# Patient Record
Sex: MALE | Race: WHITE | NOT HISPANIC OR LATINO | Employment: FULL TIME | URBAN - METROPOLITAN AREA
[De-identification: names, ages, dates, MRNs, and addresses within clinical notes are randomized per-mention and may not be internally consistent; named-entity substitution may affect disease eponyms.]

---

## 2017-09-03 ENCOUNTER — HOSPITAL ENCOUNTER (INPATIENT)
Facility: HOSPITAL | Age: 37
LOS: 2 days | Discharge: HOME | End: 2017-09-06
Attending: EMERGENCY MEDICINE | Admitting: HOSPITALIST

## 2017-09-03 DIAGNOSIS — M00.9 PYOGENIC ARTHRITIS OF LEFT ELBOW, DUE TO UNSPECIFIED ORGANISM (HCC): Primary | ICD-10-CM

## 2017-09-03 LAB
ALBUMIN SERPL-MCNC: 4.1 G/DL (ref 3.5–5.2)
ALBUMIN/GLOB SERPL: 1.3 G/DL
ALP SERPL-CCNC: 92 U/L (ref 39–117)
ALT SERPL W P-5'-P-CCNC: 26 U/L (ref 1–41)
ANION GAP SERPL CALCULATED.3IONS-SCNC: 15 MMOL/L
AST SERPL-CCNC: 28 U/L (ref 1–40)
BILIRUB SERPL-MCNC: 0.7 MG/DL (ref 0.1–1.2)
BUN BLD-MCNC: 29 MG/DL (ref 6–20)
BUN/CREAT SERPL: 13.9 (ref 7–25)
CALCIUM SPEC-SCNC: 9.2 MG/DL (ref 8.6–10.5)
CHLORIDE SERPL-SCNC: 97 MMOL/L (ref 98–107)
CO2 SERPL-SCNC: 25 MMOL/L (ref 22–29)
CREAT BLD-MCNC: 2.09 MG/DL (ref 0.76–1.27)
D-LACTATE SERPL-SCNC: 2.2 MMOL/L (ref 0.5–2)
DACRYOCYTES BLD QL SMEAR: ABNORMAL
DEPRECATED RDW RBC AUTO: 43.5 FL (ref 37–54)
ERYTHROCYTE [DISTWIDTH] IN BLOOD BY AUTOMATED COUNT: 13.2 % (ref 11.5–14.5)
GFR SERPL CREATININE-BSD FRML MDRD: 36 ML/MIN/1.73
GLOBULIN UR ELPH-MCNC: 3.2 GM/DL
GLUCOSE BLD-MCNC: 93 MG/DL (ref 65–99)
HCT VFR BLD AUTO: 40.3 % (ref 40.4–52.2)
HGB BLD-MCNC: 13.7 G/DL (ref 13.7–17.6)
LYMPHOCYTES # BLD MANUAL: 0.31 10*3/MM3 (ref 0.9–4.8)
LYMPHOCYTES NFR BLD MANUAL: 2 % (ref 19.6–45.3)
LYMPHOCYTES NFR BLD MANUAL: 6 % (ref 5–12)
MCH RBC QN AUTO: 30.6 PG (ref 27–32.7)
MCHC RBC AUTO-ENTMCNC: 34 G/DL (ref 32.6–36.4)
MCV RBC AUTO: 90 FL (ref 79.8–96.2)
METAMYELOCYTES NFR BLD MANUAL: 2 % (ref 0–0)
MONOCYTES # BLD AUTO: 0.93 10*3/MM3 (ref 0.2–1.2)
NEUTROPHILS # BLD AUTO: 13.97 10*3/MM3 (ref 1.9–8.1)
NEUTROPHILS NFR BLD MANUAL: 90 % (ref 42.7–76)
NEUTS VAC BLD QL SMEAR: ABNORMAL
PLAT MORPH BLD: NORMAL
PLATELET # BLD AUTO: 158 10*3/MM3 (ref 140–500)
PMV BLD AUTO: 11.1 FL (ref 6–12)
POTASSIUM BLD-SCNC: 3.6 MMOL/L (ref 3.5–5.2)
PROT SERPL-MCNC: 7.3 G/DL (ref 6–8.5)
RBC # BLD AUTO: 4.48 10*6/MM3 (ref 4.6–6)
SCAN SLIDE: NORMAL
SODIUM BLD-SCNC: 137 MMOL/L (ref 136–145)
WBC NRBC COR # BLD: 15.52 10*3/MM3 (ref 4.5–10.7)

## 2017-09-03 PROCEDURE — 85007 BL SMEAR W/DIFF WBC COUNT: CPT | Performed by: EMERGENCY MEDICINE

## 2017-09-03 PROCEDURE — 25010000002 PIPERACILLIN SOD-TAZOBACTAM PER 1 G: Performed by: EMERGENCY MEDICINE

## 2017-09-03 PROCEDURE — 99284 EMERGENCY DEPT VISIT MOD MDM: CPT

## 2017-09-03 PROCEDURE — 83605 ASSAY OF LACTIC ACID: CPT | Performed by: EMERGENCY MEDICINE

## 2017-09-03 PROCEDURE — 86140 C-REACTIVE PROTEIN: CPT | Performed by: EMERGENCY MEDICINE

## 2017-09-03 PROCEDURE — 87040 BLOOD CULTURE FOR BACTERIA: CPT | Performed by: EMERGENCY MEDICINE

## 2017-09-03 PROCEDURE — 85652 RBC SED RATE AUTOMATED: CPT | Performed by: EMERGENCY MEDICINE

## 2017-09-03 PROCEDURE — 80053 COMPREHEN METABOLIC PANEL: CPT | Performed by: EMERGENCY MEDICINE

## 2017-09-03 PROCEDURE — 25010000002 ONDANSETRON PER 1 MG: Performed by: EMERGENCY MEDICINE

## 2017-09-03 PROCEDURE — 85025 COMPLETE CBC W/AUTO DIFF WBC: CPT | Performed by: EMERGENCY MEDICINE

## 2017-09-03 PROCEDURE — 25010000002 HYDROMORPHONE PER 4 MG: Performed by: EMERGENCY MEDICINE

## 2017-09-03 RX ORDER — ONDANSETRON 2 MG/ML
4 INJECTION INTRAMUSCULAR; INTRAVENOUS ONCE
Status: COMPLETED | OUTPATIENT
Start: 2017-09-03 | End: 2017-09-03

## 2017-09-03 RX ADMIN — HYDROMORPHONE HYDROCHLORIDE 1 MG: 1 INJECTION, SOLUTION INTRAMUSCULAR; INTRAVENOUS; SUBCUTANEOUS at 23:57

## 2017-09-03 RX ADMIN — TAZOBACTAM SODIUM AND PIPERACILLIN SODIUM 4.5 G: 500; 4 INJECTION, SOLUTION INTRAVENOUS at 23:58

## 2017-09-03 RX ADMIN — ONDANSETRON 4 MG: 2 INJECTION INTRAMUSCULAR; INTRAVENOUS at 23:55

## 2017-09-04 ENCOUNTER — APPOINTMENT (OUTPATIENT)
Dept: GENERAL RADIOLOGY | Facility: HOSPITAL | Age: 37
End: 2017-09-04

## 2017-09-04 ENCOUNTER — APPOINTMENT (OUTPATIENT)
Dept: MRI IMAGING | Facility: HOSPITAL | Age: 37
End: 2017-09-04

## 2017-09-04 PROBLEM — I47.1 SVT (SUPRAVENTRICULAR TACHYCARDIA) (HCC): Status: ACTIVE | Noted: 2017-09-04

## 2017-09-04 PROBLEM — N17.9 AKI (ACUTE KIDNEY INJURY) (HCC): Status: ACTIVE | Noted: 2017-09-04

## 2017-09-04 PROBLEM — M00.9 PYOGENIC ARTHRITIS OF LEFT ELBOW (HCC): Status: ACTIVE | Noted: 2017-09-04

## 2017-09-04 LAB
ANION GAP SERPL CALCULATED.3IONS-SCNC: 12.2 MMOL/L
BUN BLD-MCNC: 26 MG/DL (ref 6–20)
BUN/CREAT SERPL: 15.7 (ref 7–25)
CALCIUM SPEC-SCNC: 7.6 MG/DL (ref 8.6–10.5)
CHLORIDE SERPL-SCNC: 102 MMOL/L (ref 98–107)
CO2 SERPL-SCNC: 22.8 MMOL/L (ref 22–29)
CREAT BLD-MCNC: 1.66 MG/DL (ref 0.76–1.27)
CRP SERPL-MCNC: 39.39 MG/DL (ref 0–0.5)
D-LACTATE SERPL-SCNC: 1.2 MMOL/L (ref 0.5–2)
DEPRECATED RDW RBC AUTO: 43.6 FL (ref 37–54)
ERYTHROCYTE [DISTWIDTH] IN BLOOD BY AUTOMATED COUNT: 13.1 % (ref 11.5–14.5)
ERYTHROCYTE [SEDIMENTATION RATE] IN BLOOD: 20 MM/HR (ref 0–15)
GFR SERPL CREATININE-BSD FRML MDRD: 47 ML/MIN/1.73
GLUCOSE BLD-MCNC: 94 MG/DL (ref 65–99)
HCT VFR BLD AUTO: 34.9 % (ref 40.4–52.2)
HGB BLD-MCNC: 11.6 G/DL (ref 13.7–17.6)
MAGNESIUM SERPL-MCNC: 1.8 MG/DL (ref 1.6–2.6)
MCH RBC QN AUTO: 30.5 PG (ref 27–32.7)
MCHC RBC AUTO-ENTMCNC: 33.2 G/DL (ref 32.6–36.4)
MCV RBC AUTO: 91.8 FL (ref 79.8–96.2)
PHOSPHATE SERPL-MCNC: 2.1 MG/DL (ref 2.5–4.5)
PLATELET # BLD AUTO: 137 10*3/MM3 (ref 140–500)
PMV BLD AUTO: 10.8 FL (ref 6–12)
POTASSIUM BLD-SCNC: 3.6 MMOL/L (ref 3.5–5.2)
RBC # BLD AUTO: 3.8 10*6/MM3 (ref 4.6–6)
SODIUM BLD-SCNC: 137 MMOL/L (ref 136–145)
VANCOMYCIN SERPL-MCNC: 9.4 MCG/ML (ref 5–40)
WBC NRBC COR # BLD: 10.69 10*3/MM3 (ref 4.5–10.7)

## 2017-09-04 PROCEDURE — 25010000002 VANCOMYCIN 10 G RECONSTITUTED SOLUTION: Performed by: EMERGENCY MEDICINE

## 2017-09-04 PROCEDURE — 36415 COLL VENOUS BLD VENIPUNCTURE: CPT | Performed by: EMERGENCY MEDICINE

## 2017-09-04 PROCEDURE — 80048 BASIC METABOLIC PNL TOTAL CA: CPT | Performed by: HOSPITALIST

## 2017-09-04 PROCEDURE — 25010000002 ONDANSETRON PER 1 MG: Performed by: HOSPITALIST

## 2017-09-04 PROCEDURE — 73070 X-RAY EXAM OF ELBOW: CPT

## 2017-09-04 PROCEDURE — 25010000002 HYDROMORPHONE PER 4 MG: Performed by: HOSPITALIST

## 2017-09-04 PROCEDURE — 87040 BLOOD CULTURE FOR BACTERIA: CPT | Performed by: EMERGENCY MEDICINE

## 2017-09-04 PROCEDURE — 85027 COMPLETE CBC AUTOMATED: CPT | Performed by: HOSPITALIST

## 2017-09-04 PROCEDURE — 25010000002 VANCOMYCIN 10 G RECONSTITUTED SOLUTION: Performed by: INTERNAL MEDICINE

## 2017-09-04 PROCEDURE — 83605 ASSAY OF LACTIC ACID: CPT | Performed by: EMERGENCY MEDICINE

## 2017-09-04 PROCEDURE — 84100 ASSAY OF PHOSPHORUS: CPT | Performed by: HOSPITALIST

## 2017-09-04 PROCEDURE — 83735 ASSAY OF MAGNESIUM: CPT | Performed by: HOSPITALIST

## 2017-09-04 PROCEDURE — 80202 ASSAY OF VANCOMYCIN: CPT

## 2017-09-04 RX ORDER — ACETAMINOPHEN 325 MG/1
650 TABLET ORAL EVERY 4 HOURS PRN
Status: DISCONTINUED | OUTPATIENT
Start: 2017-09-04 | End: 2017-09-06 | Stop reason: HOSPADM

## 2017-09-04 RX ORDER — FLECAINIDE ACETATE 50 MG/1
100 TABLET ORAL EVERY 12 HOURS SCHEDULED
Status: DISCONTINUED | OUTPATIENT
Start: 2017-09-04 | End: 2017-09-06 | Stop reason: HOSPADM

## 2017-09-04 RX ORDER — HYDROCODONE BITARTRATE AND ACETAMINOPHEN 10; 325 MG/1; MG/1
1 TABLET ORAL EVERY 4 HOURS PRN
Status: DISCONTINUED | OUTPATIENT
Start: 2017-09-04 | End: 2017-09-06 | Stop reason: HOSPADM

## 2017-09-04 RX ORDER — ONDANSETRON 4 MG/1
4 TABLET, FILM COATED ORAL EVERY 6 HOURS PRN
Status: DISCONTINUED | OUTPATIENT
Start: 2017-09-04 | End: 2017-09-06 | Stop reason: HOSPADM

## 2017-09-04 RX ORDER — SODIUM CHLORIDE 9 MG/ML
75 INJECTION, SOLUTION INTRAVENOUS CONTINUOUS
Status: DISCONTINUED | OUTPATIENT
Start: 2017-09-04 | End: 2017-09-06 | Stop reason: HOSPADM

## 2017-09-04 RX ORDER — BUPROPION HYDROCHLORIDE 150 MG/1
150 TABLET, EXTENDED RELEASE ORAL DAILY
Status: DISCONTINUED | OUTPATIENT
Start: 2017-09-04 | End: 2017-09-06 | Stop reason: HOSPADM

## 2017-09-04 RX ORDER — VANCOMYCIN HYDROCHLORIDE 1 G/200ML
1000 INJECTION, SOLUTION INTRAVENOUS EVERY 12 HOURS
Status: DISCONTINUED | OUTPATIENT
Start: 2017-09-04 | End: 2017-09-04

## 2017-09-04 RX ORDER — DIAZEPAM 5 MG/ML
5 INJECTION, SOLUTION INTRAMUSCULAR; INTRAVENOUS ONCE AS NEEDED
Status: DISCONTINUED | OUTPATIENT
Start: 2017-09-04 | End: 2017-09-06 | Stop reason: HOSPADM

## 2017-09-04 RX ORDER — VANCOMYCIN HYDROCHLORIDE 1 G/200ML
1000 INJECTION, SOLUTION INTRAVENOUS EVERY 12 HOURS
Status: DISCONTINUED | OUTPATIENT
Start: 2017-09-04 | End: 2017-09-04 | Stop reason: DRUGHIGH

## 2017-09-04 RX ORDER — BISACODYL 5 MG/1
5 TABLET, DELAYED RELEASE ORAL DAILY PRN
Status: DISCONTINUED | OUTPATIENT
Start: 2017-09-04 | End: 2017-09-06 | Stop reason: HOSPADM

## 2017-09-04 RX ORDER — DILTIAZEM HYDROCHLORIDE 120 MG/1
120 CAPSULE, COATED, EXTENDED RELEASE ORAL
Status: DISCONTINUED | OUTPATIENT
Start: 2017-09-04 | End: 2017-09-06 | Stop reason: HOSPADM

## 2017-09-04 RX ORDER — ONDANSETRON 2 MG/ML
4 INJECTION INTRAMUSCULAR; INTRAVENOUS EVERY 6 HOURS PRN
Status: DISCONTINUED | OUTPATIENT
Start: 2017-09-04 | End: 2017-09-06 | Stop reason: HOSPADM

## 2017-09-04 RX ORDER — ONDANSETRON 4 MG/1
4 TABLET, ORALLY DISINTEGRATING ORAL EVERY 6 HOURS PRN
Status: DISCONTINUED | OUTPATIENT
Start: 2017-09-04 | End: 2017-09-06 | Stop reason: HOSPADM

## 2017-09-04 RX ORDER — GABAPENTIN 300 MG/1
300 CAPSULE ORAL 4 TIMES DAILY
Status: DISCONTINUED | OUTPATIENT
Start: 2017-09-04 | End: 2017-09-06 | Stop reason: HOSPADM

## 2017-09-04 RX ORDER — BISACODYL 10 MG
10 SUPPOSITORY, RECTAL RECTAL DAILY PRN
Status: DISCONTINUED | OUTPATIENT
Start: 2017-09-04 | End: 2017-09-06 | Stop reason: HOSPADM

## 2017-09-04 RX ORDER — HYDROMORPHONE HYDROCHLORIDE 1 MG/ML
0.5 INJECTION, SOLUTION INTRAMUSCULAR; INTRAVENOUS; SUBCUTANEOUS
Status: DISCONTINUED | OUTPATIENT
Start: 2017-09-04 | End: 2017-09-06 | Stop reason: HOSPADM

## 2017-09-04 RX ORDER — SODIUM CHLORIDE 0.9 % (FLUSH) 0.9 %
1-10 SYRINGE (ML) INJECTION AS NEEDED
Status: DISCONTINUED | OUTPATIENT
Start: 2017-09-04 | End: 2017-09-06 | Stop reason: HOSPADM

## 2017-09-04 RX ORDER — NITROGLYCERIN 0.4 MG/1
0.4 TABLET SUBLINGUAL
Status: DISCONTINUED | OUTPATIENT
Start: 2017-09-04 | End: 2017-09-06 | Stop reason: HOSPADM

## 2017-09-04 RX ADMIN — VANCOMYCIN HYDROCHLORIDE 1500 MG: 10 INJECTION, POWDER, LYOPHILIZED, FOR SOLUTION INTRAVENOUS at 00:36

## 2017-09-04 RX ADMIN — HYDROMORPHONE HYDROCHLORIDE 0.5 MG: 1 INJECTION, SOLUTION INTRAMUSCULAR; INTRAVENOUS; SUBCUTANEOUS at 08:00

## 2017-09-04 RX ADMIN — HYDROMORPHONE HYDROCHLORIDE 0.5 MG: 1 INJECTION, SOLUTION INTRAMUSCULAR; INTRAVENOUS; SUBCUTANEOUS at 10:12

## 2017-09-04 RX ADMIN — VANCOMYCIN HYDROCHLORIDE 1250 MG: 10 INJECTION, POWDER, LYOPHILIZED, FOR SOLUTION INTRAVENOUS at 14:46

## 2017-09-04 RX ADMIN — HYDROCODONE BITARTRATE AND ACETAMINOPHEN 1 TABLET: 10; 325 TABLET ORAL at 14:36

## 2017-09-04 RX ADMIN — ONDANSETRON 4 MG: 2 INJECTION INTRAMUSCULAR; INTRAVENOUS at 04:29

## 2017-09-04 RX ADMIN — VANCOMYCIN HYDROCHLORIDE 1250 MG: 10 INJECTION, POWDER, LYOPHILIZED, FOR SOLUTION INTRAVENOUS at 23:47

## 2017-09-04 RX ADMIN — HYDROMORPHONE HYDROCHLORIDE 0.5 MG: 1 INJECTION, SOLUTION INTRAMUSCULAR; INTRAVENOUS; SUBCUTANEOUS at 17:04

## 2017-09-04 RX ADMIN — GABAPENTIN 300 MG: 300 CAPSULE ORAL at 17:47

## 2017-09-04 RX ADMIN — HYDROCODONE BITARTRATE AND ACETAMINOPHEN 1 TABLET: 10; 325 TABLET ORAL at 23:47

## 2017-09-04 RX ADMIN — FLECAINIDE ACETATE 100 MG: 50 TABLET ORAL at 21:01

## 2017-09-04 RX ADMIN — SODIUM CHLORIDE 75 ML/HR: 9 INJECTION, SOLUTION INTRAVENOUS at 02:04

## 2017-09-04 RX ADMIN — SODIUM CHLORIDE 75 ML/HR: 9 INJECTION, SOLUTION INTRAVENOUS at 23:48

## 2017-09-04 RX ADMIN — HYDROMORPHONE HYDROCHLORIDE 0.5 MG: 1 INJECTION, SOLUTION INTRAMUSCULAR; INTRAVENOUS; SUBCUTANEOUS at 12:23

## 2017-09-04 RX ADMIN — SODIUM CHLORIDE 2178 ML: 9 INJECTION, SOLUTION INTRAVENOUS at 00:07

## 2017-09-04 RX ADMIN — HYDROMORPHONE HYDROCHLORIDE 0.5 MG: 1 INJECTION, SOLUTION INTRAMUSCULAR; INTRAVENOUS; SUBCUTANEOUS at 01:52

## 2017-09-04 RX ADMIN — ONDANSETRON 4 MG: 4 TABLET, ORALLY DISINTEGRATING ORAL at 19:33

## 2017-09-04 RX ADMIN — GABAPENTIN 300 MG: 300 CAPSULE ORAL at 21:01

## 2017-09-04 RX ADMIN — HYDROCODONE BITARTRATE AND ACETAMINOPHEN 1 TABLET: 10; 325 TABLET ORAL at 19:36

## 2017-09-04 RX ADMIN — HYDROMORPHONE HYDROCHLORIDE 0.5 MG: 1 INJECTION, SOLUTION INTRAMUSCULAR; INTRAVENOUS; SUBCUTANEOUS at 04:26

## 2017-09-04 NOTE — CONSULTS
Inpatient Consult to Orthopedic Surgery  Consult performed by: CHERELLE VEGA JR  Consult ordered by: MARTHA BOYCE          Patient Care Team:  Milton Vigil MD as PCP - General (Family Medicine)    Chief complaint: left elbow pain    Subjective     History of Present Illness     The patient is a pleasant 37-year-old right hand dominant male who presents with several days of progressive left elbow pain and swelling.  He denies any specific antecedent trauma or injury.  He does report a minor scratch on the proximal forearm proximal 25-7 days ago while working on his car.  He went to a local urgent care this weekend, where a provider attempted drainage of the olecranon bursa.  He reports this was largely unsuccessful.  He was placed on oral antibiotics and told to go to emergency department if he developed increasing pain or fevers.  The patient reports his swelling and pain increased, so he came to the emergency department for evaluation and management.  In the emergency room, his white blood cell count was elevated to 15.  CRP was 39, ESR was 20.  He had evidence of mildly elevated lactic acid at 2.2  He also had evidence of acute renal insufficiency.    He was admitted to the medicine service for antibiotics and resuscitation for presumed early sepsis.  Orthopedics was consulted for evaluation of his left elbow.    Patient does report mild improvement in his symptoms since initiation of antibiotics.  He currently feels well, denies fevers, chills, nausea, vomiting.    White blood cell count is trending down.  It is 11 this morning.  Lactic acid is now normal.  He has been afebrile since admission.    Review of Systems     Past Medical History:   Diagnosis Date   • Irregular heart beat    • Kidney stone    • Restless leg syndrome    ,   Past Surgical History:   Procedure Laterality Date   • KIDNEY STONE SURGERY     • SHOULDER SURGERY     ,   Family History   Problem Relation Age of Onset   • Irregular  heart beat Father    • No Known Problems Mother    ,   Social History   Substance Use Topics   • Smoking status: Never Smoker   • Smokeless tobacco: Never Used   • Alcohol use No   ,   Prescriptions Prior to Admission   Medication Sig Dispense Refill Last Dose   • ALPRAZolam (XANAX) 0.5 MG tablet    More than a month at Unknown time   • buPROPion SR (WELLBUTRIN SR) 150 MG 12 hr tablet Take 150 mg by mouth.   9/2/2017 at Unknown time   • diltiaZEM CD (CARDIZEM CD) 120 MG 24 hr capsule Take 120 mg by mouth.   9/2/2017 at Unknown time   • diltiaZEM CD (CARDIZEM CD) 120 MG 24 hr capsule Take 120 mg by mouth.      • doxycycline (MONODOX) 100 MG capsule Take 1 capsule by mouth 2 (Two) Times a Day. 20 capsule 0    • flecainide (TAMBOCOR) 100 MG tablet Take 100 mg by mouth.   9/2/2017 at Unknown time   • gabapentin (NEURONTIN) 300 MG capsule Take 300 mg by mouth.   9/2/2017 at Unknown time   • HYDROcodone-acetaminophen (NORCO)  MG per tablet Take 1 tablet by mouth Every 6 (Six) Hours As Needed for Moderate Pain . 20 tablet 0    • ibuprofen (ADVIL,MOTRIN) 800 MG tablet    9/2/2017 at Unknown time   • sulfamethoxazole-trimethoprim (BACTRIM DS,SEPTRA DS) 800-160 MG per tablet Take 2 tablets by mouth 2 times a day for 10 days 40 tablet 0    , Scheduled Meds:    buPROPion  mg Oral Daily   diltiaZEM  mg Oral Q24H   flecainide 100 mg Oral Q12H   vancomycin 1,000 mg Intravenous Q12H   , Continuous Infusions:    Pharmacy to dose vancomycin     sodium chloride 75 mL/hr Last Rate: 75 mL/hr (09/04/17 0204)   , PRN Meds:  •  acetaminophen  •  bisacodyl  •  bisacodyl  •  HYDROcodone-acetaminophen  •  HYDROmorphone  •  nitroglycerin  •  ondansetron **OR** ondansetron ODT **OR** ondansetron  •  Pharmacy to dose vancomycin  •  sodium chloride and Allergies:  Review of patient's allergies indicates no known allergies.    Objective      Vital Signs  Temp:  [98.7 °F (37.1 °C)-99.5 °F (37.5 °C)] 98.7 °F (37.1 °C)  Heart Rate:   [75-88] 76  Resp:  [18-20] 20  BP: (113-131)/(68-75) 131/74    Physical Exam  Physical Exam:  Vital signs reviewed.  Constitutional:  Appears well-developed, well nourished.  Nontoxic appearing.  HEENT:  Head: normocephalic, atraumatic  Eyes: EOMI, grossly normal.  No scleral icterus.  Neck: Normal range of motion.  Supple, no tracheal deviation.  Cardiovascular: Regular rate.  Pulmonary: Effort normal, symmetric chest expansion, no labored breathing.  Abdominal: Soft, non distended  Neurological: No gross deficits, oriented to person, place, and time.  Skin: Warm and dry  Psychiatric: Normal mood and affect  Musculoskeletal:    Examination of his left upper extremity shows diffuse swelling and bogginess posterior to the elbow and the olecranon bursa.  The bursa is ballotable.  Mild surrounding erythema.  This is very tender to palpation.  There is evidence of a prior attempted drainage distally with a scabbed needle entry site.  No purulence or drainage.  Evidence of a healing superficial abrasion in the proximal/radial forearm with mild surrounding erythema.    He tolerates considerable range of motion at the elbow.  He tolerates 5-90° of elbow flexion with minimal pain.  He tolerates 40° of supination and 40° of pronation with minimal pain.    He is motor intact distally, AIN/PIN/radial/ulnar/median distribution.  Sensation intact to light touch in radial, ulnar, median distribution.  2+ radial pulse.      Results Review:   Radiographs of the left elbow independently reviewed.  They show prominent soft tissue swelling posteriorly.  No significant evidence of elbow effusion.  Radiocapitellar joint remains well aligned.  No evidence of fracture or dislocation.        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  Although septic arthritis of the elbow remains a consideration, given the relatively large degree of range of motion tolerated by the patient, I think this is less likely.  Given his exam, I would refrain from arthrocentesis at this point as I think the risk of seeding the joint through his septic olecranon bursitis/cellulitis outweighs the benefits at this point.    - Recommend initial medical management with IV antibiotics as directed by infectious disease/medicine for presumed septic bursitis  - Pain control, NSAIDs, elevation, gentle compression  - No current urgent indication for surgery, most cases of septic olecranon bursitis can be treated without surgery.  However, if he fails to improve or develops signs or symptoms of a clear abscess, or clinical scenario becomes more concerning for septic arthritis, he may ultimately require surgical drainage  - Primary team has ordered MRI.  We'll follow-up results, suspect will showed large septic bursitis      Thank you for this consultation.  We'll follow.    Maikel Isidro Jr, MD  09/04/17  7:39 AM

## 2017-09-04 NOTE — H&P
Name: Bob Henry Jr. ADMIT: 9/3/2017   : 1980  PCP: Milton Vigil MD    MRN: [id number] LOS: 0 days   AGE/SEX: 37 y.o. male  ROOM: VISHAL/VISHAL     Chief Complaint   Patient presents with   • Arm Swelling       Subjective   Patient is a 37 y.o. male who presents to [facility] with the above chief complaint.  He is very pleasant young gentleman who presents with left elbow swelling and pain.  His symptoms started yesterday.  Said he had what I thought was a pimple on his elbow when he tried to pop it.  No fluid or anything came down.  By the following morning he woke up he had fevers and chills throughout the night and his arm was extremely swollen.  He continued to swell throughout the day and sent him to the urgent care yesterday where they attempted to drain the bursa.  He tolerated that procedure well and was started on antibiotics.  However the swelling and erythema worsened and he said fevers and chills throughout the day again.  This brought him to the emergency room this evening.  He's never had any infections in the past and does not remember any trauma to the elbow.  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  But again doesn't remember any trauma or injury.  He has an underlying history of SVT and is on medications for this and has not had any issues in the past.    History of Present Illness    Past Medical History:   Diagnosis Date   • Irregular heart beat    • Kidney stone    • Restless leg syndrome      Past Surgical History:   Procedure Laterality Date   • KIDNEY STONE SURGERY     • SHOULDER SURGERY       Family History   Problem Relation Age of Onset   • Irregular heart beat Father    • No Known Problems Mother      Social History   Substance Use Topics   • Smoking status: Never Smoker   • Smokeless tobacco: Never Used   • Alcohol use No       (Not in a hospital admission)  Allergies:  Review of patient's allergies indicates no known  allergies.    Review of Systems   Constitutional: Negative for chills, fatigue and fever.   HENT: Negative for congestion, rhinorrhea and sore throat.    Eyes: Negative for redness.   Respiratory: Negative for cough, chest tightness and shortness of breath.    Cardiovascular: Negative for chest pain, palpitations and leg swelling.   Gastrointestinal: Negative for abdominal distention, blood in stool and nausea.   Endocrine: Negative for polydipsia, polyphagia and polyuria.   Genitourinary: Negative for difficulty urinating, frequency and hematuria.   Musculoskeletal: Positive for arthralgias, joint swelling and myalgias. Negative for back pain and gait problem.   Skin: Negative for pallor and rash.   Allergic/Immunologic: Negative for environmental allergies and immunocompromised state.   Neurological: Negative for dizziness, syncope and numbness.   Hematological: Negative for adenopathy. Does not bruise/bleed easily.   Psychiatric/Behavioral: Negative for agitation, behavioral problems and confusion.        Objective    Vital Signs  Temp:  [99.3 °F (37.4 °C)-99.5 °F (37.5 °C)] 99.5 °F (37.5 °C)  Heart Rate:  [75-88] 77  Resp:  [18] 18  BP: (113-119)/(68-75) 113/71  SpO2:  [94 %-99 %] 97 %  on   ;   O2 Device: room air  Body mass index is 21.7 kg/(m^2).    Physical Exam   Constitutional: He is oriented to person, place, and time. He appears well-developed and well-nourished. No distress.   HENT:   Head: Normocephalic.   Eyes: EOM are normal. No scleral icterus.   Neck: Neck supple. No JVD present.   Cardiovascular: Normal rate, regular rhythm and normal heart sounds.    No murmur heard.  Pulmonary/Chest: Effort normal and breath sounds normal. No respiratory distress. He has no wheezes.   Abdominal: Soft. Bowel sounds are normal. He exhibits no distension. There is no tenderness.   Musculoskeletal: He exhibits edema, tenderness and deformity.   His left elbow is swollen there is tightness in his hand with range of  motion he's unable to bend his elbow secondary to pain.  The joint is warm to the touch and very tender.   Neurological: He is alert and oriented to person, place, and time. No cranial nerve deficit.   Skin: Skin is warm and dry. No rash noted. There is erythema. No pallor.   Psychiatric: He has a normal mood and affect. His behavior is normal. Thought content normal.   Nursing note and vitals reviewed.      Results Review:   I reviewed the patient's new clinical results.    Results from last 7 days  Lab Units 09/03/17  2248   WBC 10*3/mm3 15.52*   HEMOGLOBIN g/dL 13.7   PLATELETS 10*3/mm3 158     Results from last 7 days  Lab Units 09/03/17  2248   SODIUM mmol/L 137   POTASSIUM mmol/L 3.6   CHLORIDE mmol/L 97*   CO2 mmol/L 25.0   BUN mg/dL 29*   CREATININE mg/dL 2.09*   GLUCOSE mg/dL 93   ALBUMIN g/dL 4.10   BILIRUBIN mg/dL 0.7   ALK PHOS U/L 92   AST (SGOT) U/L 28   ALT (SGPT) U/L 26   Estimated Creatinine Clearance: 49.7 mL/min (by C-G formula based on Cr of 2.09).        XR Elbow 2 View Left   Final Result   1. No acute osseous abnormality.       This report was finalized on 9/4/2017 12:37 AM by Felipe Gutierres MD.            Assessment/Plan     Active Problems:    Pyogenic arthritis of left elbow    MOUNIKA (acute kidney injury)    SVT (supraventricular tachycardia)      Assessment & Plan  He likely has a septic arthritis of the left elbow.  I've consulted orthopedic surgery.  We'll go and plan for an MRI of his elbow tomorrow morning for further evaluation.  Inflammatory markers are elevated.  He has fevers and chills as well as in the organ damage with acute renal failure this represents sepsis on admission.  He was bolused appropriately with IV fluids and was started on empiric antibiotics and cultures are pending.  I've consulted infectious disease to assist with antibiotic management.  I'm concerned regarding his acute renal failure.  His baseline creatinine appears to be around 1.2.  We'll monitor with  hydration and see if this improves will also check urine electrolytes will hold off on a renal ultrasound for now unless his kidney numbers don't improve.  He'll likely require surgical washout and debridement if MRI shows infection.  We'll keep nothing by mouth for now.  As far as operative risk is concerned he only has a history of arrhythmia and no coronary artery disease.  He's been stable for quite some time on medications.  We'll continue these meds for now I see no medical contraindication to proceeding with surgery if needed.  Risk was discussed with the patient is agreeable to proceed if needed.    I discussed the patients findings and my recommendations with patient, family and nursing staff.    Navjot Guzmán MD  [facility]  09/04/17  1:22 AM

## 2017-09-04 NOTE — ED PROVIDER NOTES
EMERGENCY DEPARTMENT ENCOUNTER    CHIEF COMPLAINT  Chief Complaint: Arm swelling  History given by: patient  History limited by: nothing  Room Number: 28/28  PMD: Milton Vigil MD      HPI:  Pt is a 37 y.o. male who presents complaining of LUE swelling that onset 2 days ago. Pt states that he had a bursitis drained 2 days ago from the same location. Pt states that it is painful to move.     Duration:  2 days  Onset: gradual  Timing: constant  Location: LUE  Radiation: none  Quality: swelling  Intensity/Severity: moderate  Progression: unchanged  Associated Symptoms: pain  Aggravating Factors: movement  Alleviating Factors: none specified  Previous Episodes: pt has had no previous episodes  Treatment before arrival: None    PAST MEDICAL HISTORY  Active Ambulatory Problems     Diagnosis Date Noted   • No Active Ambulatory Problems     Resolved Ambulatory Problems     Diagnosis Date Noted   • No Resolved Ambulatory Problems     Past Medical History:   Diagnosis Date   • Irregular heart beat    • Kidney stone    • Restless leg syndrome        PAST SURGICAL HISTORY  Past Surgical History:   Procedure Laterality Date   • KIDNEY STONE SURGERY     • SHOULDER SURGERY         FAMILY HISTORY  Family History   Problem Relation Age of Onset   • Irregular heart beat Father    • No Known Problems Mother        SOCIAL HISTORY  Social History     Social History   • Marital status:      Spouse name: N/A   • Number of children: N/A   • Years of education: N/A     Occupational History   • Not on file.     Social History Main Topics   • Smoking status: Never Smoker   • Smokeless tobacco: Never Used   • Alcohol use No   • Drug use: No   • Sexual activity: Not on file     Other Topics Concern   • Not on file     Social History Narrative       ALLERGIES  Review of patient's allergies indicates no known allergies.    REVIEW OF SYSTEMS  Review of Systems   Constitutional: Negative for activity change, appetite change and fever.    HENT: Negative for congestion and sore throat.    Eyes: Negative.    Respiratory: Negative for cough and shortness of breath.    Cardiovascular: Negative for chest pain and leg swelling.   Gastrointestinal: Negative for abdominal pain, diarrhea and vomiting.   Endocrine: Negative.    Genitourinary: Negative for decreased urine volume and dysuria.   Musculoskeletal: Positive for myalgias (LUE). Negative for neck pain.        LUE swelling   Skin: Negative for rash and wound.   Allergic/Immunologic: Negative.    Neurological: Negative for weakness, numbness and headaches.   Hematological: Negative.    Psychiatric/Behavioral: Negative.    All other systems reviewed and are negative.      PHYSICAL EXAM  ED Triage Vitals   Temp Heart Rate Resp BP SpO2   09/03/17 2026 09/03/17 2026 09/03/17 2026 09/03/17 2026 09/03/17 2026   99.3 °F (37.4 °C) 88 18 119/75 98 %      Temp src Heart Rate Source Patient Position BP Location FiO2 (%)   09/03/17 2026 09/03/17 2026 -- -- --   Tympanic Monitor          Physical Exam   Constitutional: No distress.   Appears uncomfortabele     HENT:   Head: Normocephalic and atraumatic.   Eyes: EOM are normal.   Neck: Normal range of motion.   Cardiovascular: Normal heart sounds.    Pulmonary/Chest: No respiratory distress.   Abdominal: There is no tenderness.   Musculoskeletal: He exhibits no edema.        Left upper arm: He exhibits swelling.   Significant soft tissue swelling, warmth, and erythema on L elbow down the posterior aspect of the L forearm and up the posterior aspect towards the L shoulder. Limited ROM due to pain and swelling. NV intact distally.    Neurological: He is alert.   Skin: Skin is warm and dry. There is erythema (LUE).   Nursing note and vitals reviewed.      LAB RESULTS  Lab Results (last 24 hours)     Procedure Component Value Units Date/Time    CBC & Differential [[id number]] Collected:  09/03/17 2248    Specimen:  Blood Updated:  09/03/17 2331    Narrative:       The  following orders were created for panel order CBC & Differential.  Procedure                               Abnormality         Status                     ---------                               -----------         ------                     Manual Differential[[id number]]          Abnormal            Final result               Scan Slide[[id number]]                                       Final result               CBC Auto Differential[[id number]]        Abnormal            Final result                 Please view results for these tests on the individual orders.    Comprehensive Metabolic Panel [[id number]]  (Abnormal) Collected:  09/03/17 2248    Specimen:  Blood Updated:  09/03/17 2316     Glucose 93 mg/dL      BUN 29 (H) mg/dL      Creatinine 2.09 (H) mg/dL      Sodium 137 mmol/L      Potassium 3.6 mmol/L      Chloride 97 (L) mmol/L      CO2 25.0 mmol/L      Calcium 9.2 mg/dL      Total Protein 7.3 g/dL      Albumin 4.10 g/dL      ALT (SGPT) 26 U/L      AST (SGOT) 28 U/L      Alkaline Phosphatase 92 U/L      Total Bilirubin 0.7 mg/dL      eGFR Non African Amer 36 (L) mL/min/1.73      Globulin 3.2 gm/dL      A/G Ratio 1.3 g/dL      BUN/Creatinine Ratio 13.9     Anion Gap 15.0 mmol/L     Lactic Acid, Plasma [[id number]]  (Abnormal) Collected:  09/03/17 2248    Specimen:  Blood Updated:  09/03/17 2320     Lactate 2.2 (C) mmol/L     Blood Culture [[id number]] Collected:  09/03/17 2248    Specimen:  Blood from Arm, Right Updated:  09/03/17 2252    CBC Auto Differential [[id number]]  (Abnormal) Collected:  09/03/17 2248    Specimen:  Blood Updated:  09/03/17 2331     WBC 15.52 (H) 10*3/mm3      RBC 4.48 (L) 10*6/mm3      Hemoglobin 13.7 g/dL      Hematocrit 40.3 (L) %      MCV 90.0 fL      MCH 30.6 pg      MCHC 34.0 g/dL      RDW 13.2 %      RDW-SD 43.5 fl      MPV 11.1 fL      Platelets 158 10*3/mm3     Scan Slide [[id number]] Collected:  09/03/17 2248    Specimen:  Blood Updated:  09/03/17 2331     Scan Slide --      See  Manual Differential Results       Manual Differential [[id number]]  (Abnormal) Collected:  09/03/17 2248    Specimen:  Blood Updated:  09/03/17 2331     Neutrophil % 90.0 (H) %       2+ bands        Lymphocyte % 2.0 (L) %      Monocyte % 6.0 %      Metamyelocyte % 2.0 (H) %      Neutrophils Absolute 13.97 (H) 10*3/mm3      Lymphocytes Absolute 0.31 (L) 10*3/mm3      Monocytes Absolute 0.93 10*3/mm3      Dacrocytes Slight/1+     Vacuolated Neutrophils Slight/1+     Platelet Morphology Normal    C-reactive Protein [[id number]] Collected:  09/03/17 2248    Specimen:  Blood Updated:  09/04/17 0012    Sedimentation Rate [[id number]] Collected:  09/03/17 2248    Specimen:  Blood Updated:  09/04/17 0022          I ordered the above labs and reviewed the results    RADIOLOGY  XR Elbow 2 View Left    (Results Pending)        I ordered the above noted radiological studies. Interpreted by radiologist. Reviewed by me in PACS.       PROCEDURES  Procedures      PROGRESS AND CONSULTS  ED Course     2057  Ordered blood work, blood culture, and lactic acid for further evaluation.     2353  Discussed plan to admit to treat infection. Pt agrees to plan and all questions are addressed.     2355  Ordered sedimentation rate, C reactive protein, and L elbow XR for further evaluation. Ordered Dilaudid/Zofran for pain/nausea, IVF for hydration, and vancomycin/zosyn for infection. Placed call to [facility] for admission.     0024  Discussed pt's case with Dr. Guzmán ([facility]), who will admit to [facility].     MEDICAL DECISION MAKING  Results were reviewed/discussed with the patient and they were also made aware of online access. Pt also made aware that some labs, such as cultures, will not be resulted during ER visit and follow up with PMD is necessary.     MDM  Number of Diagnoses or Management Options     Amount and/or Complexity of Data Reviewed  Clinical lab tests: ordered and reviewed           DIAGNOSIS  Final diagnoses:   Pyogenic arthritis of  left elbow, due to unspecified organism       DISPOSITION  ADMISSION    Discussed treatment plan and reason for admission with pt/family and admitting physician.  Pt/family voiced understanding of the plan for admission for further testing/treatment as needed.         Latest Documented Vital Signs:  As of 12:25 AM  BP- 115/69 HR- 80 Temp- 99.3 °F (37.4 °C) (Tympanic) O2 sat- 94%    --  Documentation assistance provided by anuj Stern for Dr. Kruse.  Information recorded by the scraga was done at my direction and has been verified and validated by me.          Lena Stern  09/04/17 0025       Oleg Kruse MD  09/04/17 0054

## 2017-09-04 NOTE — PLAN OF CARE
Problem: Patient Care Overview (Adult)  Goal: Plan of Care Review  Outcome: Ongoing (interventions implemented as appropriate)  Goal: Adult Individualization and Mutuality  Outcome: Ongoing (interventions implemented as appropriate)  Goal: Discharge Needs Assessment  Outcome: Ongoing (interventions implemented as appropriate)    Problem: Pain, Acute (Adult)  Goal: Identify Related Risk Factors and Signs and Symptoms  Outcome: Outcome(s) achieved Date Met:  09/04/17  Goal: Acceptable Pain Control/Comfort Level  Outcome: Ongoing (interventions implemented as appropriate)    Problem: Fall Risk (Adult)  Goal: Identify Related Risk Factors and Signs and Symptoms  Outcome: Unable to achieve outcome(s) by discharge Date Met:  09/04/17  Goal: Absence of Falls  Outcome: Ongoing (interventions implemented as appropriate)

## 2017-09-04 NOTE — NURSING NOTE
Patient started having a panic attack while down for MRI.  He stated that he has had many MRI's before and never had any trouble but upon returning to the floor he was pacing and unable to calm himself down.  Ordered a fan and let him spend time with family while I called MDs.  Dr Kuo ordered Valium IV for before MRI when it can be rescheduled, Dr Wright on behalf of Dr Isidro stated that it was ok to let him eat the rest of the evening because MRI would not be able to be performed until tomorrow per Grecia in MRI. Patient calmed down some and eating lunch

## 2017-09-04 NOTE — CONSULTS
CONSULT NOTE    Infectious Diseases - Martha Rose MD  [facility]       Patient Identification:  Name: Bob Henry Jr.  Age: 37 y.o.  Sex: male  :  1980  MRN: [id number]             Date of Consultation: 2017        Primary Care Physician: Milton Vigil MD                               Requesting Physician: Navjot Guzmán MD  Reason for Consultation: Sepsis with left elbow cellulitis    Impression: 37-year-old male with:  1-left elbow soft tissue infection with folliculitis/carbuncle with associated forearm and arm cellulitis likely due to strep/MRSA  2-acute on chronic renal failure multifactorial  3-history of SVT on flecainide  4-severe claustrophobia and anxiety      Recommendations/Discussions: At this juncture would recommend to continue vancomycin follow-up on the blood cultures and renal function.  Orthopedic consultation and MRI of the shoulder has been requested and follow the lead.  The real issue is to differentiate between soft tissue infection versus intra-articular involvement.  Clinically it appears that the joint is spared and most of this is soft tissue infection that may require surgical drainage.  Duration of antibiotic treatment would depend upon whether joint is involved or not.  Thank you Dr. Guzmán for letting me be the part of the patient care please see above impression and recommendation        History of Present Illness:   Patient is 37-year-old male who is past medical history as listed below was in his usual state of his health until Friday evening when he noticed a pimple on his left elbow.  Patient squeezed on those pimples and as the night went on his left buttock become painful and was having fever and chills.  He was miserable on Saturday and went to [facility] with a try to drain it and then given some oral antibiotics.  He was given Bactrim.  Patient continued to feel poorly and by yesterday evening he was miserable and having fever and  chills.  His redness and swelling in the tightness of the forearm and arm continued to get worse.  With that he arrived to the emergency room where he was found to be in acute renal failure with sepsis and leukocytosis .  Patient doesn't recall trauma or injury or exposure to any sick contact.    Past Medical History:  Past Medical History:   Diagnosis Date   • Irregular heart beat    • Kidney stone    • Restless leg syndrome      Past Surgical History:  Past Surgical History:   Procedure Laterality Date   • KIDNEY STONE SURGERY     • SHOULDER SURGERY        Home Meds:  Prescriptions Prior to Admission   Medication Sig Dispense Refill Last Dose   • ALPRAZolam (XANAX) 0.5 MG tablet    More than a month at Unknown time   • buPROPion SR (WELLBUTRIN SR) 150 MG 12 hr tablet Take 150 mg by mouth.   9/2/2017 at Unknown time   • diltiaZEM CD (CARDIZEM CD) 120 MG 24 hr capsule Take 120 mg by mouth.   9/2/2017 at Unknown time   • diltiaZEM CD (CARDIZEM CD) 120 MG 24 hr capsule Take 120 mg by mouth.      • doxycycline (MONODOX) 100 MG capsule Take 1 capsule by mouth 2 (Two) Times a Day. 20 capsule 0    • flecainide (TAMBOCOR) 100 MG tablet Take 100 mg by mouth.   9/2/2017 at Unknown time   • gabapentin (NEURONTIN) 300 MG capsule Take 300 mg by mouth.   9/2/2017 at Unknown time   • HYDROcodone-acetaminophen (NORCO)  MG per tablet Take 1 tablet by mouth Every 6 (Six) Hours As Needed for Moderate Pain . 20 tablet 0    • ibuprofen (ADVIL,MOTRIN) 800 MG tablet    9/2/2017 at Unknown time   • sulfamethoxazole-trimethoprim (BACTRIM DS,SEPTRA DS) 800-160 MG per tablet Take 2 tablets by mouth 2 times a day for 10 days 40 tablet 0      Current Meds:     Current Facility-Administered Medications:   •  acetaminophen (TYLENOL) tablet 650 mg, 650 mg, Oral, Q4H PRN, Navjot Guzmán MD  •  bisacodyl (DULCOLAX) EC tablet 5 mg, 5 mg, Oral, Daily PRN, Navjot Guzmán MD  •  bisacodyl (DULCOLAX) suppository 10 mg, 10 mg, Rectal,  Daily PRN, Navjot Guzmán MD  •  buPROPion SR (WELLBUTRIN SR) 12 hr tablet 150 mg, 150 mg, Oral, Daily, Navjot Guzmán MD  •  diltiaZEM CD (CARDIZEM CD) 24 hr capsule 120 mg, 120 mg, Oral, Q24H, Navjot Guzmán MD  •  flecainide (TAMBOCOR) tablet 100 mg, 100 mg, Oral, Q12H, Navjot Guzmán MD  •  HYDROcodone-acetaminophen (NORCO)  MG per tablet 1 tablet, 1 tablet, Oral, Q4H PRN, Navjot Guzmán MD  •  HYDROmorphone (DILAUDID) injection 0.5 mg, 0.5 mg, Intravenous, Q2H PRN, Navjot Guzmán MD, 0.5 mg at 09/04/17 0426  •  nitroglycerin (NITROSTAT) SL tablet 0.4 mg, 0.4 mg, Sublingual, Q5 Min PRN, Navjot Guzmán MD  •  ondansetron (ZOFRAN) tablet 4 mg, 4 mg, Oral, Q6H PRN **OR** ondansetron ODT (ZOFRAN-ODT) disintegrating tablet 4 mg, 4 mg, Oral, Q6H PRN **OR** ondansetron (ZOFRAN) injection 4 mg, 4 mg, Intravenous, Q6H PRN, Navjot Guzmán MD, 4 mg at 09/04/17 0429  •  Pharmacy to dose vancomycin, , Does not apply, Continuous PRN, Navjot Guzmán MD  •  sodium chloride 0.9 % flush 1-10 mL, 1-10 mL, Intravenous, PRN, Navjot Guzmán MD  •  sodium chloride 0.9 % infusion, 75 mL/hr, Intravenous, Continuous, Navjot Guzmán MD, Last Rate: 75 mL/hr at 09/04/17 0204, 75 mL/hr at 09/04/17 0204  •  vancomycin (VANCOCIN) in iso-osmotic dextrose IVPB 1 g (premix) 200 mL, 1,000 mg, Intravenous, Q12H, Navjot Guzmán MD  Allergies:  No Known Allergies  Social History:   Social History   Substance Use Topics   • Smoking status: Never Smoker   • Smokeless tobacco: Never Used   • Alcohol use No      Family History:  Family History   Problem Relation Age of Onset   • Irregular heart beat Father    • No Known Problems Mother           Review of Systems  See history of present illness and past medical history.   Constitutional: Negative for activity change, appetite change and fever.   HENT: Negative for congestion and sore throat.    Eyes: Negative.    Respiratory: Negative for  "cough and shortness of breath.    Cardiovascular: Negative for chest pain and leg swelling.   Gastrointestinal: Negative for abdominal pain, diarrhea and vomiting.   Endocrine: Negative.    Genitourinary: Negative for decreased urine volume and dysuria.   Musculoskeletal: Positive for myalgias (LUE). Negative for neck pain.        LUE swelling   Skin: Negative for rash and wound.   Allergic/Immunologic: Negative.    Neurological: Negative for weakness, numbness and headaches.   Hematological: Negative.    Psychiatric/Behavioral: Negative.     Vitals:   /74 (BP Location: Right arm, Patient Position: Lying)  Pulse 76  Temp 98.7 °F (37.1 °C) (Oral)   Resp 20  Ht 72\" (182.9 cm)  Wt 160 lb (72.6 kg)  SpO2 97%  BMI 21.7 kg/m2  I/O:   Intake/Output Summary (Last 24 hours) at 09/04/17 0736  Last data filed at 09/04/17 0133   Gross per 24 hour   Intake                0 ml   Output              250 ml   Net             -250 ml     Exam:  General Appearance:    Alert, cooperative, no distress, appears stated age   Head:    Normocephalic, without obvious abnormality, atraumatic   Eyes:    PERRL, conjunctiva/corneas clear, EOM's intact, both eyes   Ears:    Normal external ear canals, both ears   Nose:   Nares normal, septum midline, mucosa normal, no drainage    or sinus tenderness   Throat:   Lips, tongue, gums normal; oral mucosa pink and moist   Neck:   Supple, symmetrical, trachea midline, no adenopathy;     thyroid:  no enlargement/tenderness/nodules; no carotid    bruit or JVD   Back:     Symmetric, no curvature, ROM normal, no CVA tenderness   Lungs:     Clear to auscultation bilaterally, respirations unlabored   Chest Wall:    No tenderness or deformity    Heart:    Regular rate and rhythm, S1 and S2 normal, no murmur, rub   or gallop   Abdomen:     Soft, non-tender, bowel sounds active all four quadrants,     no masses, no hepatomegaly, no splenomegaly   Extremities:   Left elbow has 2 scabbed areas with " swelling of the elbow extending to the forearm and arm.  Supination pronation and flexion extension is intact but limited because of the tense swelling around the forearm and elbow.     Pulses:   Pulses palpable in all extremities; symmetric all extremities   Skin:   Skin color normal, Skin is warm and dry,  no rashes or palpable lesions   Neurologic:   CNII-XII intact, motor strength grossly intact, sensation grossly intact to light touch, no focal deficits noted       Data Review:    I reviewed the patient's new clinical results.    Results from last 7 days  Lab Units 09/04/17  0251 09/03/17  2248   WBC 10*3/mm3 10.69 15.52*   HEMOGLOBIN g/dL 11.6* 13.7   PLATELETS 10*3/mm3 137* 158       Results from last 7 days  Lab Units 09/04/17  0251 09/03/17  2248   SODIUM mmol/L 137 137   POTASSIUM mmol/L 3.6 3.6   CHLORIDE mmol/L 102 97*   CO2 mmol/L 22.8 25.0   BUN mg/dL 26* 29*   CREATININE mg/dL 1.66* 2.09*   CALCIUM mg/dL 7.6* 9.2   GLUCOSE mg/dL 94 93     No results found for: CULTURE]    Assessment:  Active Hospital Problems (** Indicates Principal Problem)    Diagnosis Date Noted   • Pyogenic arthritis of left elbow [M00.9] 09/04/2017   • MOUNIKA (acute kidney injury) [N17.9] 09/04/2017   • SVT (supraventricular tachycardia) [I47.1] 09/04/2017      Resolved Hospital Problems    Diagnosis Date Noted Date Resolved   No resolved problems to display.         Plan:  See above  Martha Oliveros MD   9/4/2017  7:36 AM    Much of this encounter note is an electronic transcription/translation of spoken language to printed text. The electronic translation of spoken language may permit erroneous, or at times, nonsensical words or phrases to be inadvertently transcribed; Although I have reviewed the note for such errors, some may still exist

## 2017-09-04 NOTE — PROGRESS NOTES
"Pharmacokinetic Consult - Vancomycin Dosing (Initial Note)    Bob Henry Jr. has been consulted for pharmacy to dose vancomycin for skin and soft tissue infection.  Pharmacy dosing vancomycin per Dr Persaud's request.   Goal trough: 15-20 mg/L   Other antimicrobials: none    Relevant clinical data and objective history reviewed:  37 y.o. male 72\" (182.9 cm) 160 lb (72.6 kg)    Past Medical History:   Diagnosis Date   • Irregular heart beat    • Kidney stone    • Restless leg syndrome      Creatinine   Date Value Ref Range Status   09/03/2017 2.09 (H) 0.76 - 1.27 mg/dL Final     BUN   Date Value Ref Range Status   09/03/2017 29 (H) 6 - 20 mg/dL Final     Estimated Creatinine Clearance: 49.7 mL/min (by C-G formula based on Cr of 2.09).    Lab Results   Component Value Date    WBC 15.52 (H) 09/03/2017     Temp Readings from Last 3 Encounters:   09/04/17 98.7 °F (37.1 °C) (Oral)   09/02/17 99 °F (37.2 °C) (Tympanic)   06/15/17 97.8 °F (36.6 °C) (Tympanic)           Assessment/Plan  Will start vancomycin 1,000 mg IV Q12h (LD 1,500mg). Will monitor serum creatinine every 24 hours for the first 3 days then at least every 48 hours per dosing recommendations. Vancomycin level prior to 4th dose. Pharmacy will continue to follow daily while on vancomycin and adjust as needed.     Edmond Barkley, [facility]    "

## 2017-09-04 NOTE — PROGRESS NOTES
"Pharmacokinetic Evaluation - Vancomycin    Bob Henry Jr. is a 37 y.o. male on vancomycin pharmacy to dose.  MRN: [id number]  : 1980    Day of vancomycin therapy: 1 follow up from 3rd shift  Indication: SSTI; septic bursitis  Consulted by: Dr. Persaud  Goal trough: 15-20mg/L    Current dose: 1000mg q12h (13.7mg/kg)      Blood pressure 117/65, pulse 89, temperature (!) 100.6 °F (38.1 °C), temperature source Oral, resp. rate 18, height 72\" (182.9 cm), weight 160 lb (72.6 kg), SpO2 97 %.    Results from last 7 days  Lab Units 17  0251 17  2248   CREATININE mg/dL 1.66* 2.09*     Estimated Creatinine Clearance: 62.6 mL/min (by C-G formula based on Cr of 1.66).    Results from last 7 days  Lab Units 17  0251 17  2248   WBC 10*3/mm3 10.69 15.52*   HEMOGLOBIN g/dL 11.6* 13.7   HEMATOCRIT % 34.9* 40.3*   PLATELETS 10*3/mm3 137* 158         Other relevant labs/chart info: CRP 39.39, lactate 2.2-->1.2    Cultures:    Elbow Asp: in process  9/3 Bcx: inprocess    Dosing hx (include troughs if drawn):   0036 Vanco 1500mg (20mg/kg)   1011 Vanco random: 9.4mg/L; safety check    Assessment:  Renal function appears to be unstable. At admission Scr was 2.09 and improved to 1.66 this morning. Random was drawn to ensure safe to start schedule dosing. Random was 9.4mg/L well below ok to start schedule dosing. However will continue to monitor renal function very closely. Will increase dose slightly to 1250mg (17mg/kg) q12h.     Plan:  1) Increase to vancomycin 1250 mg every 12 hours.  2) Next trough on  at 1130 after 2 total doses of 1250mg to make sure Scr is continue to improve and patient does not over accumulate.  3) Monitor for UOP and SCR.    Efren Stein [facility]    "

## 2017-09-05 ENCOUNTER — APPOINTMENT (OUTPATIENT)
Dept: MRI IMAGING | Facility: HOSPITAL | Age: 37
End: 2017-09-05
Attending: HOSPITALIST

## 2017-09-05 LAB
CREAT BLD-MCNC: 1.08 MG/DL (ref 0.76–1.27)
DEPRECATED RDW RBC AUTO: 42.2 FL (ref 37–54)
ERYTHROCYTE [DISTWIDTH] IN BLOOD BY AUTOMATED COUNT: 12.8 % (ref 11.5–14.5)
GFR SERPL CREATININE-BSD FRML MDRD: 77 ML/MIN/1.73
HCT VFR BLD AUTO: 31.9 % (ref 40.4–52.2)
HGB BLD-MCNC: 10.9 G/DL (ref 13.7–17.6)
MCH RBC QN AUTO: 30.7 PG (ref 27–32.7)
MCHC RBC AUTO-ENTMCNC: 34.2 G/DL (ref 32.6–36.4)
MCV RBC AUTO: 89.9 FL (ref 79.8–96.2)
PLATELET # BLD AUTO: 139 10*3/MM3 (ref 140–500)
PMV BLD AUTO: 10.9 FL (ref 6–12)
RBC # BLD AUTO: 3.55 10*6/MM3 (ref 4.6–6)
VANCOMYCIN TROUGH SERPL-MCNC: 9.7 MCG/ML (ref 5–20)
WBC NRBC COR # BLD: 9.63 10*3/MM3 (ref 4.5–10.7)

## 2017-09-05 PROCEDURE — 0 GADOBENATE DIMEGLUMINE 529 MG/ML SOLUTION: Performed by: INTERNAL MEDICINE

## 2017-09-05 PROCEDURE — 73223 MRI JOINT UPR EXTR W/O&W/DYE: CPT

## 2017-09-05 PROCEDURE — 25010000002 LORAZEPAM PER 2 MG: Performed by: HOSPITALIST

## 2017-09-05 PROCEDURE — 25010000002 VANCOMYCIN 10 G RECONSTITUTED SOLUTION: Performed by: INTERNAL MEDICINE

## 2017-09-05 PROCEDURE — 25010000002 HYDROMORPHONE PER 4 MG: Performed by: HOSPITALIST

## 2017-09-05 PROCEDURE — 80202 ASSAY OF VANCOMYCIN: CPT | Performed by: HOSPITALIST

## 2017-09-05 PROCEDURE — 94799 UNLISTED PULMONARY SVC/PX: CPT

## 2017-09-05 PROCEDURE — 82565 ASSAY OF CREATININE: CPT

## 2017-09-05 PROCEDURE — A9577 INJ MULTIHANCE: HCPCS | Performed by: INTERNAL MEDICINE

## 2017-09-05 PROCEDURE — 25010000002 ONDANSETRON PER 1 MG: Performed by: HOSPITALIST

## 2017-09-05 PROCEDURE — 85027 COMPLETE CBC AUTOMATED: CPT | Performed by: ORTHOPAEDIC SURGERY

## 2017-09-05 PROCEDURE — 25010000002 VANCOMYCIN PER 500 MG: Performed by: INTERNAL MEDICINE

## 2017-09-05 RX ORDER — LORAZEPAM 2 MG/ML
1 INJECTION INTRAMUSCULAR ONCE
Status: COMPLETED | OUTPATIENT
Start: 2017-09-05 | End: 2017-09-05

## 2017-09-05 RX ORDER — VANCOMYCIN HYDROCHLORIDE 1 G/200ML
1000 INJECTION, SOLUTION INTRAVENOUS EVERY 8 HOURS
Status: DISCONTINUED | OUTPATIENT
Start: 2017-09-05 | End: 2017-09-06 | Stop reason: HOSPADM

## 2017-09-05 RX ADMIN — HYDROMORPHONE HYDROCHLORIDE 0.5 MG: 1 INJECTION, SOLUTION INTRAMUSCULAR; INTRAVENOUS; SUBCUTANEOUS at 08:51

## 2017-09-05 RX ADMIN — GABAPENTIN 300 MG: 300 CAPSULE ORAL at 21:56

## 2017-09-05 RX ADMIN — VANCOMYCIN HYDROCHLORIDE 1250 MG: 10 INJECTION, POWDER, LYOPHILIZED, FOR SOLUTION INTRAVENOUS at 12:23

## 2017-09-05 RX ADMIN — BUPROPION HYDROCHLORIDE 150 MG: 150 TABLET, FILM COATED ORAL at 17:40

## 2017-09-05 RX ADMIN — HYDROMORPHONE HYDROCHLORIDE 0.5 MG: 1 INJECTION, SOLUTION INTRAMUSCULAR; INTRAVENOUS; SUBCUTANEOUS at 14:45

## 2017-09-05 RX ADMIN — HYDROMORPHONE HYDROCHLORIDE 0.5 MG: 1 INJECTION, SOLUTION INTRAMUSCULAR; INTRAVENOUS; SUBCUTANEOUS at 03:04

## 2017-09-05 RX ADMIN — HYDROMORPHONE HYDROCHLORIDE 0.5 MG: 1 INJECTION, SOLUTION INTRAMUSCULAR; INTRAVENOUS; SUBCUTANEOUS at 12:30

## 2017-09-05 RX ADMIN — DILTIAZEM HYDROCHLORIDE 120 MG: 120 CAPSULE, COATED, EXTENDED RELEASE ORAL at 17:40

## 2017-09-05 RX ADMIN — LORAZEPAM 1 MG: 2 INJECTION INTRAMUSCULAR; INTRAVENOUS at 08:51

## 2017-09-05 RX ADMIN — ONDANSETRON 4 MG: 2 INJECTION INTRAMUSCULAR; INTRAVENOUS at 09:25

## 2017-09-05 RX ADMIN — HYDROMORPHONE HYDROCHLORIDE 0.5 MG: 1 INJECTION, SOLUTION INTRAMUSCULAR; INTRAVENOUS; SUBCUTANEOUS at 10:27

## 2017-09-05 RX ADMIN — FLECAINIDE ACETATE 100 MG: 50 TABLET ORAL at 21:57

## 2017-09-05 RX ADMIN — GABAPENTIN 300 MG: 300 CAPSULE ORAL at 17:40

## 2017-09-05 RX ADMIN — HYDROMORPHONE HYDROCHLORIDE 0.5 MG: 1 INJECTION, SOLUTION INTRAMUSCULAR; INTRAVENOUS; SUBCUTANEOUS at 06:06

## 2017-09-05 RX ADMIN — GADOBENATE DIMEGLUMINE 15 ML: 529 INJECTION, SOLUTION INTRAVENOUS at 11:16

## 2017-09-05 RX ADMIN — HYDROCODONE BITARTRATE AND ACETAMINOPHEN 1 TABLET: 10; 325 TABLET ORAL at 17:40

## 2017-09-05 RX ADMIN — VANCOMYCIN HYDROCHLORIDE 1000 MG: 1 INJECTION, SOLUTION INTRAVENOUS at 21:56

## 2017-09-05 RX ADMIN — HYDROCODONE BITARTRATE AND ACETAMINOPHEN 1 TABLET: 10; 325 TABLET ORAL at 21:56

## 2017-09-05 NOTE — PLAN OF CARE
Problem: Patient Care Overview (Adult)  Goal: Plan of Care Review  Outcome: Ongoing (interventions implemented as appropriate)    Problem: Fall Risk (Adult)  Goal: Absence of Falls  Outcome: Ongoing (interventions implemented as appropriate)    09/05/17 0223   Fall Risk (Adult)   Absence of Falls making progress toward outcome

## 2017-09-05 NOTE — PLAN OF CARE
Problem: Patient Care Overview (Adult)  Goal: Plan of Care Review  Outcome: Ongoing (interventions implemented as appropriate)    09/05/17 0223 09/05/17 0254   Coping/Psychosocial Response Interventions   Plan Of Care Reviewed With --  patient   Patient Care Overview   Progress --  improving   Outcome Evaluation   Outcome Summary/Follow up Plan HTN and irregular heart rate controlled with PO meds. pain well controlled.  --          Problem: Pain, Acute (Adult)  Goal: Acceptable Pain Control/Comfort Level  Outcome: Ongoing (interventions implemented as appropriate)    Problem: Fall Risk (Adult)  Goal: Absence of Falls  Outcome: Ongoing (interventions implemented as appropriate)

## 2017-09-05 NOTE — PROGRESS NOTES
Discharge Planning Assessment  [facility]     Patient Name: Bob Henry Jr.  MRN: [id number]  Today's Date: 9/5/2017    Admit Date: 9/3/2017          Discharge Needs Assessment     None            Discharge Plan       09/05/17 1618    Case Management/Social Work Plan    Plan D/c home w/ [facility] (home IV abx).     Patient/Family In Agreement With Plan yes    Additional Comments Facesheet and d/c plan verified, pt plans to d/c home w/ [facility] for home IV abx. [facility] following.         Discharge Placement     Facility/Agency Request Status Selected? Address Phone Number Fax Number    [facility] Accepted    Yes [id number] [address] [phone]                Demographic Summary     None            Functional Status     None            Psychosocial     None            Abuse/Neglect     None            Legal     None            Substance Abuse     None            Patient Forms     None          Renetta Umanzor RN

## 2017-09-05 NOTE — PAYOR COMM NOTE
"UR CONTACT:   PHYLLIS   P:  [phone]  F:  [phone]        Carl Bhumi SHELTON Jr. (37 y.o. Male)     Date of Birth Social Security Number Address Home Phone MRN    1980  [facility] [phone] [id number]    Mandaen Marital Status          None        Admission Date Admission Type Admitting Provider Attending Provider Department, Room/Bed    9/3/17 Emergency Navjot Guzmán MD [place], Nate Ruiz MD [facility] [place], P780/1    Discharge Date Discharge Disposition Discharge Destination                      Attending Provider: Nate Verduzco MD     Allergies:  No Known Allergies    Isolation:  None   Infection:  None   Code Status:  FULL    Ht:  72\" (182.9 cm)   Wt:  160 lb (72.6 kg)    Admission Cmt:  None   Principal Problem:  None                Active Insurance as of 9/3/2017     Primary Coverage     Payor Plan Insurance Group Employer/Plan Group    [employer] EMPLOYEE [id number]     Payor Plan Address Payor Plan Phone Number Effective From Effective To    [address] [phone] 2015     [place] [address]       Subscriber Name Subscriber Birth Date Member ID       CARLBHUMI SHELTON JRKerline 1980 [id number]                 Emergency Contacts      (Rel.) Home Phone Work Phone Mobile Phone    Jenifer Henry (Spouse) [phone] -- --               History & Physical      Navjot Guzmán MD at 2017  1:22 AM              Name: Bhumi Henry Jr. ADMIT: 9/3/2017   : 1980  PCP: Milton Vigil MD    MRN: [id number] LOS: 0 days   AGE/SEX: 37 y.o. male  ROOM: VISHAL/VISHAL     Chief Complaint   Patient presents with   • Arm Swelling       Subjective   Patient is a 37 y.o. male who presents to [facility] with the above chief complaint.  He is very pleasant young gentleman who presents with left elbow swelling and pain.  His symptoms started " yesterday.  Said he had what I thought was a pimple on his elbow when he tried to pop it.  No fluid or anything came down.  By the following morning he woke up he had fevers and chills throughout the night and his arm was extremely swollen.  He continued to swell throughout the day and sent him to the urgent care yesterday where they attempted to drain the bursa.  He tolerated that procedure well and was started on antibiotics.  However the swelling and erythema worsened and he said fevers and chills throughout the day again.  This brought him to the emergency room this evening.  He's never had any infections in the past and does not remember any trauma to the elbow.  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  But again doesn't remember any trauma or injury.  He has an underlying history of SVT and is on medications for this and has not had any issues in the past.    History of Present Illness    Past Medical History:   Diagnosis Date   • Irregular heart beat    • Kidney stone    • Restless leg syndrome      Past Surgical History:   Procedure Laterality Date   • KIDNEY STONE SURGERY     • SHOULDER SURGERY       Family History   Problem Relation Age of Onset   • Irregular heart beat Father    • No Known Problems Mother      Social History   Substance Use Topics   • Smoking status: Never Smoker   • Smokeless tobacco: Never Used   • Alcohol use No       (Not in a hospital admission)  Allergies:  Review of patient's allergies indicates no known allergies.    Review of Systems   Constitutional: Negative for chills, fatigue and fever.   HENT: Negative for congestion, rhinorrhea and sore throat.    Eyes: Negative for redness.   Respiratory: Negative for cough, chest tightness and shortness of breath.    Cardiovascular: Negative for chest pain, palpitations and leg swelling.   Gastrointestinal: Negative for abdominal distention, blood in stool and nausea.   Endocrine: Negative for  polydipsia, polyphagia and polyuria.   Genitourinary: Negative for difficulty urinating, frequency and hematuria.   Musculoskeletal: Positive for arthralgias, joint swelling and myalgias. Negative for back pain and gait problem.   Skin: Negative for pallor and rash.   Allergic/Immunologic: Negative for environmental allergies and immunocompromised state.   Neurological: Negative for dizziness, syncope and numbness.   Hematological: Negative for adenopathy. Does not bruise/bleed easily.   Psychiatric/Behavioral: Negative for agitation, behavioral problems and confusion.        Objective    Vital Signs  Temp:  [99.3 °F (37.4 °C)-99.5 °F (37.5 °C)] 99.5 °F (37.5 °C)  Heart Rate:  [75-88] 77  Resp:  [18] 18  BP: (113-119)/(68-75) 113/71  SpO2:  [94 %-99 %] 97 %  on   ;   O2 Device: room air  Body mass index is 21.7 kg/(m^2).    Physical Exam   Constitutional: He is oriented to person, place, and time. He appears well-developed and well-nourished. No distress.   HENT:   Head: Normocephalic.   Eyes: EOM are normal. No scleral icterus.   Neck: Neck supple. No JVD present.   Cardiovascular: Normal rate, regular rhythm and normal heart sounds.    No murmur heard.  Pulmonary/Chest: Effort normal and breath sounds normal. No respiratory distress. He has no wheezes.   Abdominal: Soft. Bowel sounds are normal. He exhibits no distension. There is no tenderness.   Musculoskeletal: He exhibits edema, tenderness and deformity.   His left elbow is swollen there is tightness in his hand with range of motion he's unable to bend his elbow secondary to pain.  The joint is warm to the touch and very tender.   Neurological: He is alert and oriented to person, place, and time. No cranial nerve deficit.   Skin: Skin is warm and dry. No rash noted. There is erythema. No pallor.   Psychiatric: He has a normal mood and affect. His behavior is normal. Thought content normal.   Nursing note and vitals reviewed.      Results Review:   I reviewed  the patient's new clinical results.    Results from last 7 days  Lab Units 09/03/17  2248   WBC 10*3/mm3 15.52*   HEMOGLOBIN g/dL 13.7   PLATELETS 10*3/mm3 158     Results from last 7 days  Lab Units 09/03/17  2248   SODIUM mmol/L 137   POTASSIUM mmol/L 3.6   CHLORIDE mmol/L 97*   CO2 mmol/L 25.0   BUN mg/dL 29*   CREATININE mg/dL 2.09*   GLUCOSE mg/dL 93   ALBUMIN g/dL 4.10   BILIRUBIN mg/dL 0.7   ALK PHOS U/L 92   AST (SGOT) U/L 28   ALT (SGPT) U/L 26   Estimated Creatinine Clearance: 49.7 mL/min (by C-G formula based on Cr of 2.09).        XR Elbow 2 View Left   Final Result   1. No acute osseous abnormality.       This report was finalized on 9/4/2017 12:37 AM by Felipe Gutierres MD.            Assessment/Plan     Active Problems:    Pyogenic arthritis of left elbow    MOUNIKA (acute kidney injury)    SVT (supraventricular tachycardia)      Assessment & Plan  He likely has a septic arthritis of the left elbow.  I've consulted orthopedic surgery.  We'll go and plan for an MRI of his elbow tomorrow morning for further evaluation.  Inflammatory markers are elevated.  He has fevers and chills as well as in the organ damage with acute renal failure this represents sepsis on admission.  He was bolused appropriately with IV fluids and was started on empiric antibiotics and cultures are pending.  I've consulted infectious disease to assist with antibiotic management.  I'm concerned regarding his acute renal failure.  His baseline creatinine appears to be around 1.2.  We'll monitor with hydration and see if this improves will also check urine electrolytes will hold off on a renal ultrasound for now unless his kidney numbers don't improve.  He'll likely require surgical washout and debridement if MRI shows infection.  We'll keep nothing by mouth for now.  As far as operative risk is concerned he only has a history of arrhythmia and no coronary artery disease.  He's been stable for quite some time on medications.  We'll continue  these meds for now I see no medical contraindication to proceeding with surgery if needed.  Risk was discussed with the patient is agreeable to proceed if needed.    I discussed the patients findings and my recommendations with patient, family and nursing staff.    Navjot Guzmán MD  [facility]  09/04/17  1:22 AM         Electronically signed by Navjot Guzmán MD at 9/4/2017  2:25 AM           Emergency Department Notes      Lyle Holt RN at 9/3/2017  8:23 PM          Pt reports his left arm started having swelling 2 days ago     Lyle Holt RN  09/03/17 2024       Electronically signed by Lyle Holt RN at 9/3/2017  8:24 PM      Oleg Kruse MD at 9/3/2017 10:35 PM           EMERGENCY DEPARTMENT ENCOUNTER    CHIEF COMPLAINT  Chief Complaint: Arm swelling  History given by: patient  History limited by: nothing  Room Number: 28/28  PMD: Milton Vigil MD      HPI:  Pt is a 37 y.o. male who presents complaining of LUE swelling that onset 2 days ago. Pt states that he had a bursitis drained 2 days ago from the same location. Pt states that it is painful to move.     Duration:  2 days  Onset: gradual  Timing: constant  Location: LUE  Radiation: none  Quality: swelling  Intensity/Severity: moderate  Progression: unchanged  Associated Symptoms: pain  Aggravating Factors: movement  Alleviating Factors: none specified  Previous Episodes: pt has had no previous episodes  Treatment before arrival: None    PAST MEDICAL HISTORY  Active Ambulatory Problems     Diagnosis Date Noted   • No Active Ambulatory Problems     Resolved Ambulatory Problems     Diagnosis Date Noted   • No Resolved Ambulatory Problems     Past Medical History:   Diagnosis Date   • Irregular heart beat    • Kidney stone    • Restless leg syndrome        PAST SURGICAL HISTORY  Past Surgical History:   Procedure Laterality Date   • KIDNEY STONE SURGERY     • SHOULDER  SURGERY         FAMILY HISTORY  Family History   Problem Relation Age of Onset   • Irregular heart beat Father    • No Known Problems Mother        SOCIAL HISTORY  Social History     Social History   • Marital status:      Spouse name: N/A   • Number of children: N/A   • Years of education: N/A     Occupational History   • Not on file.     Social History Main Topics   • Smoking status: Never Smoker   • Smokeless tobacco: Never Used   • Alcohol use No   • Drug use: No   • Sexual activity: Not on file     Other Topics Concern   • Not on file     Social History Narrative       ALLERGIES  Review of patient's allergies indicates no known allergies.    REVIEW OF SYSTEMS  Review of Systems   Constitutional: Negative for activity change, appetite change and fever.   HENT: Negative for congestion and sore throat.    Eyes: Negative.    Respiratory: Negative for cough and shortness of breath.    Cardiovascular: Negative for chest pain and leg swelling.   Gastrointestinal: Negative for abdominal pain, diarrhea and vomiting.   Endocrine: Negative.    Genitourinary: Negative for decreased urine volume and dysuria.   Musculoskeletal: Positive for myalgias (LUE). Negative for neck pain.        LUE swelling   Skin: Negative for rash and wound.   Allergic/Immunologic: Negative.    Neurological: Negative for weakness, numbness and headaches.   Hematological: Negative.    Psychiatric/Behavioral: Negative.    All other systems reviewed and are negative.      PHYSICAL EXAM  ED Triage Vitals   Temp Heart Rate Resp BP SpO2   09/03/17 2026 09/03/17 2026 09/03/17 2026 09/03/17 2026 09/03/17 2026   99.3 °F (37.4 °C) 88 18 119/75 98 %      Temp src Heart Rate Source Patient Position BP Location FiO2 (%)   09/03/17 2026 09/03/17 2026 -- -- --   Tympanic Monitor          Physical Exam   Constitutional: No distress.   Appears uncomfortabele     HENT:   Head: Normocephalic and atraumatic.   Eyes: EOM are normal.   Neck: Normal range of  motion.   Cardiovascular: Normal heart sounds.    Pulmonary/Chest: No respiratory distress.   Abdominal: There is no tenderness.   Musculoskeletal: He exhibits no edema.        Left upper arm: He exhibits swelling.   Significant soft tissue swelling, warmth, and erythema on L elbow down the posterior aspect of the L forearm and up the posterior aspect towards the L shoulder. Limited ROM due to pain and swelling. NV intact distally.    Neurological: He is alert.   Skin: Skin is warm and dry. There is erythema (LUE).   Nursing note and vitals reviewed.      LAB RESULTS  Lab Results (last 24 hours)     Procedure Component Value Units Date/Time    CBC & Differential [[id number]] Collected:  09/03/17 2248    Specimen:  Blood Updated:  09/03/17 2331    Narrative:       The following orders were created for panel order CBC & Differential.  Procedure                               Abnormality         Status                     ---------                               -----------         ------                     Manual Differential[[id number]]          Abnormal            Final result               Scan Slide[[id number]]                                       Final result               CBC Auto Differential[[id number]]        Abnormal            Final result                 Please view results for these tests on the individual orders.    Comprehensive Metabolic Panel [[id number]]  (Abnormal) Collected:  09/03/17 2248    Specimen:  Blood Updated:  09/03/17 2316     Glucose 93 mg/dL      BUN 29 (H) mg/dL      Creatinine 2.09 (H) mg/dL      Sodium 137 mmol/L      Potassium 3.6 mmol/L      Chloride 97 (L) mmol/L      CO2 25.0 mmol/L      Calcium 9.2 mg/dL      Total Protein 7.3 g/dL      Albumin 4.10 g/dL      ALT (SGPT) 26 U/L      AST (SGOT) 28 U/L      Alkaline Phosphatase 92 U/L      Total Bilirubin 0.7 mg/dL      eGFR Non African Amer 36 (L) mL/min/1.73      Globulin 3.2 gm/dL      A/G Ratio 1.3 g/dL      BUN/Creatinine Ratio  13.9     Anion Gap 15.0 mmol/L     Lactic Acid, Plasma [[id number]]  (Abnormal) Collected:  09/03/17 2248    Specimen:  Blood Updated:  09/03/17 2320     Lactate 2.2 (C) mmol/L     Blood Culture [[id number]] Collected:  09/03/17 2248    Specimen:  Blood from Arm, Right Updated:  09/03/17 2252    CBC Auto Differential [[id number]]  (Abnormal) Collected:  09/03/17 2248    Specimen:  Blood Updated:  09/03/17 2331     WBC 15.52 (H) 10*3/mm3      RBC 4.48 (L) 10*6/mm3      Hemoglobin 13.7 g/dL      Hematocrit 40.3 (L) %      MCV 90.0 fL      MCH 30.6 pg      MCHC 34.0 g/dL      RDW 13.2 %      RDW-SD 43.5 fl      MPV 11.1 fL      Platelets 158 10*3/mm3     Scan Slide [[id number]] Collected:  09/03/17 2248    Specimen:  Blood Updated:  09/03/17 2331     Scan Slide --      See Manual Differential Results       Manual Differential [[id number]]  (Abnormal) Collected:  09/03/17 2248    Specimen:  Blood Updated:  09/03/17 2331     Neutrophil % 90.0 (H) %       2+ bands        Lymphocyte % 2.0 (L) %      Monocyte % 6.0 %      Metamyelocyte % 2.0 (H) %      Neutrophils Absolute 13.97 (H) 10*3/mm3      Lymphocytes Absolute 0.31 (L) 10*3/mm3      Monocytes Absolute 0.93 10*3/mm3      Dacrocytes Slight/1+     Vacuolated Neutrophils Slight/1+     Platelet Morphology Normal    C-reactive Protein [[id number]] Collected:  09/03/17 2248    Specimen:  Blood Updated:  09/04/17 0012    Sedimentation Rate [[id number]] Collected:  09/03/17 2248    Specimen:  Blood Updated:  09/04/17 0022          I ordered the above labs and reviewed the results    RADIOLOGY  XR Elbow 2 View Left    (Results Pending)        I ordered the above noted radiological studies. Interpreted by radiologist. Reviewed by me in PACS.       PROCEDURES  Procedures      PROGRESS AND CONSULTS  ED Course     2057  Ordered blood work, blood culture, and lactic acid for further evaluation.     2353  Discussed plan to admit to treat infection. Pt agrees to plan and all questions are  addressed.     2355  Ordered sedimentation rate, C reactive protein, and L elbow XR for further evaluation. Ordered Dilaudid/Zofran for pain/nausea, IVF for hydration, and vancomycin/zosyn for infection. Placed call to [facility] for admission.     0024  Discussed pt's case with Dr. Guzmán ([facility]), who will admit to [facility].     MEDICAL DECISION MAKING  Results were reviewed/discussed with the patient and they were also made aware of online access. Pt also made aware that some labs, such as cultures, will not be resulted during ER visit and follow up with PMD is necessary.     MDM  Number of Diagnoses or Management Options     Amount and/or Complexity of Data Reviewed  Clinical lab tests: ordered and reviewed           DIAGNOSIS  Final diagnoses:   Pyogenic arthritis of left elbow, due to unspecified organism       DISPOSITION  ADMISSION    Discussed treatment plan and reason for admission with pt/family and admitting physician.  Pt/family voiced understanding of the plan for admission for further testing/treatment as needed.         Latest Documented Vital Signs:  As of 12:25 AM  BP- 115/69 HR- 80 Temp- 99.3 °F (37.4 °C) (Tympanic) O2 sat- 94%    --  Documentation assistance provided by anuj Stern for Dr. Kruse.  Information recorded by the scribe was done at my direction and has been verified and validated by me.          Lena Stern  09/04/17 0025       Oleg Kruse MD  09/04/17 0054       Electronically signed by Oleg Kruse MD at 9/4/2017 12:54 AM        Lines, Drains & Airways    Active LDAs     Name:   Placement date:   Placement time:   Site:   Days:    Peripheral IV Line - Single Lumen 09/03/17 2249 median cubital vein (antecubital fossa), right 20 gauge  09/03/17 2249      1         Inactive LDAs     None                Hospital Medications (all)       Dose Frequency Start End    acetaminophen (TYLENOL) tablet 650 mg 650 mg Every 4 Hours PRN 9/4/2017     Sig - Route: Take 2 tablets  by mouth Every 4 (Four) Hours As Needed for Mild Pain . - Oral    bisacodyl (DULCOLAX) EC tablet 5 mg 5 mg Daily PRN 9/4/2017     Sig - Route: Take 1 tablet by mouth Daily As Needed for Constipation. - Oral    bisacodyl (DULCOLAX) suppository 10 mg 10 mg Daily PRN 9/4/2017     Sig - Route: Insert 1 suppository into the rectum Daily As Needed for Constipation. - Rectal    buPROPion SR (WELLBUTRIN SR) 12 hr tablet 150 mg 150 mg Daily 9/4/2017     Sig - Route: Take 1 tablet by mouth Daily. - Oral    diazePAM (VALIUM) injection 5 mg 5 mg Once As Needed 9/4/2017     Sig - Route: Infuse 1 mL into a venous catheter 1 (One) Time As Needed for Anxiety (PREMED FOR MRI). - Intravenous    diltiaZEM CD (CARDIZEM CD) 24 hr capsule 120 mg 120 mg Every 24 Hours Scheduled 9/4/2017     Sig - Route: Take 1 capsule by mouth Daily. - Oral    flecainide (TAMBOCOR) tablet 100 mg 100 mg Every 12 Hours Scheduled 9/4/2017     Sig - Route: Take 2 tablets by mouth Every 12 (Twelve) Hours. - Oral    gabapentin (NEURONTIN) capsule 300 mg 300 mg 4 Times Daily 9/4/2017     Sig - Route: Take 1 capsule by mouth 4 (Four) Times a Day. - Oral    gadobenate dimeglumine (MULTIHANCE) injection 15 mL 15 mL Once in Imaging 9/5/2017 9/5/2017    Sig - Route: Infuse 15 mL into a venous catheter Once. - Intravenous    HYDROcodone-acetaminophen (NORCO)  MG per tablet 1 tablet 1 tablet Every 4 Hours PRN 9/4/2017     Sig - Route: Take 1 tablet by mouth Every 4 (Four) Hours As Needed for Moderate Pain . - Oral    HYDROmorphone (DILAUDID) injection 0.5 mg 0.5 mg Every 2 Hours PRN 9/4/2017     Sig - Route: Infuse 0.5 mL into a venous catheter Every 2 (Two) Hours As Needed for Severe Pain . - Intravenous    HYDROmorphone (DILAUDID) injection 1 mg 1 mg Once 9/3/2017 9/3/2017    Sig - Route: Infuse 1 mL into a venous catheter 1 (One) Time. - Intravenous    LORazepam (ATIVAN) injection 1 mg 1 mg Once 9/5/2017 9/5/2017    Sig - Route: Infuse 0.5 mL into a venous  "catheter 1 (One) Time. - Intravenous    nitroglycerin (NITROSTAT) SL tablet 0.4 mg 0.4 mg Every 5 Minutes PRN 9/4/2017     Sig - Route: Place 1 tablet under the tongue Every 5 (Five) Minutes As Needed for Chest Pain (if systolic BP greater than 100 mm/Hg.). - Sublingual    ondansetron (ZOFRAN) injection 4 mg 4 mg Once 9/3/2017 9/3/2017    Sig - Route: Infuse 2 mL into a venous catheter 1 (One) Time. - Intravenous    ondansetron (ZOFRAN) injection 4 mg 4 mg Every 6 Hours PRN 9/4/2017     Sig - Route: Infuse 2 mL into a venous catheter Every 6 (Six) Hours As Needed for Nausea or Vomiting. - Intravenous    Linked Group 1:  \"Or\" Linked Group Details        ondansetron (ZOFRAN) tablet 4 mg 4 mg Every 6 Hours PRN 9/4/2017     Sig - Route: Take 1 tablet by mouth Every 6 (Six) Hours As Needed for Nausea or Vomiting. - Oral    Linked Group 1:  \"Or\" Linked Group Details        ondansetron ODT (ZOFRAN-ODT) disintegrating tablet 4 mg 4 mg Every 6 Hours PRN 9/4/2017     Sig - Route: Take 1 tablet by mouth Every 6 (Six) Hours As Needed for Nausea or Vomiting. - Oral    Linked Group 1:  \"Or\" Linked Group Details        Pharmacy to dose vancomycin  Continuous PRN 9/4/2017     Sig - Route: Continuous As Needed for Consult. - Does not apply    piperacillin-tazobactam (ZOSYN) 4.5 g in iso-osmotic dextrose 100 mL IVPB (premix) 4.5 g Once 9/3/2017 9/4/2017    Sig - Route: Infuse 100 mL into a venous catheter 1 (One) Time. - Intravenous    sodium chloride 0.9 % bolus 2,178 mL 30 mL/kg × 72.6 kg Continuous 9/3/2017 9/4/2017    Sig - Route: Infuse 2,178 mL into a venous catheter Continuous. - Intravenous    sodium chloride 0.9 % flush 1-10 mL 1-10 mL As Needed 9/4/2017     Sig - Route: Infuse 1-10 mL into a venous catheter As Needed for Line Care. - Intravenous    sodium chloride 0.9 % infusion 75 mL/hr Continuous 9/4/2017     Sig - Route: Infuse 75 mL/hr into a venous catheter Continuous. - Intravenous    vancomycin (VANCOCIN) in " "iso-osmotic dextrose IVPB 1 g (premix) 200 mL 1,000 mg Every 8 Hours 9/5/2017     Sig - Route: Infuse 200 mL into a venous catheter Every 8 (Eight) Hours. - Intravenous    vancomycin 1500 mg/500 mL 0.9% NS IVPB ([facility]) 20 mg/kg × 72.6 kg Once 9/3/2017 9/4/2017    Sig - Route: Infuse 500 mL into a venous catheter 1 (One) Time. - Intravenous    Pharmacy to dose vancomycin (Discontinued)  Continuous PRN 9/4/2017 9/4/2017    Sig - Route: Continuous As Needed for Consult. - Does not apply    Reason for Discontinue: Duplicate order    vancomycin (VANCOCIN) in iso-osmotic dextrose IVPB 1 g (premix) 200 mL (Discontinued) 1,000 mg Every 12 Hours 9/4/2017 9/4/2017    Sig - Route: Infuse 200 mL into a venous catheter Every 12 (Twelve) Hours. - Intravenous    Reason for Discontinue: Dose adjustment    vancomycin (VANCOCIN) in iso-osmotic dextrose IVPB 1 g (premix) 200 mL (Discontinued) 1,000 mg Every 12 Hours 9/4/2017 9/4/2017    Sig - Route: Infuse 200 mL into a venous catheter Every 12 (Twelve) Hours. - Intravenous    vancomycin 1250 mg/250 mL 0.9% NS IVPB ([facility]) (Discontinued) 1,250 mg Every 12 Hours 9/4/2017 9/5/2017    Sig - Route: Infuse 250 mL into a venous catheter Every 12 (Twelve) Hours. - Intravenous    Reason for Discontinue: Dose adjustment               Physician Progress Notes       Maikel Isidro Jr., MD at 9/5/2017  7:47 AM  Version 1 of 1         Orthopaedic Surgery  Daily Progress Note    /65 (BP Location: Right arm, Patient Position: Lying)  Pulse 74  Temp 99.1 °F (37.3 °C) (Oral)   Resp 16  Ht 72\" (182.9 cm)  Wt 160 lb (72.6 kg)  SpO2 94%  BMI 21.7 kg/m2    Lab Results (last 24 hours)     Procedure Component Value Units Date/Time    Vancomycin, Random [[id number]]  (Normal) Collected:  09/04/17 1011    Specimen:  Blood Updated:  09/04/17 1047     Vancomycin Random 9.40 mcg/mL     Blood Culture [[id number]]  (Normal) Collected:  09/03/17 2248    Specimen:  Blood from Arm, Right Updated:  09/04/17 1110 "     Blood Culture No growth at 24 hours    Blood Culture [[id number]]  (Normal) Collected:  09/04/17 0251    Specimen:  Blood from Hand, Right Updated:  09/05/17 0302     Blood Culture No growth at 24 hours    Creatinine, Serum [[id number]]  (Normal) Collected:  09/05/17 0315    Specimen:  Blood Updated:  09/05/17 0414     Creatinine 1.08 mg/dL      eGFR Non African Amer 77 mL/min/1.73           Imaging Results (last 24 hours)     Procedure Component Value Units Date/Time    MRI Elbow Left With & Without Contrast [[id number]] Updated:  09/05/17 0651          Patient Care Team:  Milton Vigil MD as PCP - General (Family Medicine)    SUBJECTIVE  Patient got claustrophobic in the MRI, could not stay still; had to be aborted    He reports pain in arm perhaps slightly better    PHYSICAL EXAM   Resting in NAD, nontoxic appearing    Examination of his left upper extremity perhaps mild improvement in diffuse swelling and bogginess posterior to the elbow and the olecranon bursa.  Mild surrounding erythema.   Posterior soft tissues remain tender to palpation.        He tolerates considerable range of motion at the elbow.  He tolerates 5-100° of elbow flexion without much pain.  He tolerates 50° of supination and 50° of pronation without pain.     He is motor intact distally, AIN/PIN/radial/ulnar/median distribution.  Sensation intact to light touch in radial, ulnar, median distribution.  2+ radial pulse.      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 Although he still has clear clinical evidence of cellulitis and suspected septic bursitis, he still tolerates much more ROM at the  elbow joint than I would expect with a septic arthritis. Given his exam, I still think the risk of arthrocentesis and seeding the joint through his septic olecranon bursitis/cellulitis outweighs the benefits at this point.     - Continue IV antibiotics as directed by infectious disease/medicine for presumed septic bursitis  - Pain control, NSAIDs, elevation, gentle compression  - No current urgent indication for surgery, most cases of septic olecranon bursitis can be treated without surgery.  However, if he fails to improve or develops signs or symptoms of a clear abscess, or clinical scenario becomes more concerning for septic arthritis, he may ultimately require surgical drainage  - Patient has MRI rescheduled with Valium for this morning.  Will follow up results; if shows abscess or large fluid collection may require drainage        Thank you for this consultation.  We'll follow.    Maikel Isidro Jr, MD  17  7:47 AM     Electronically signed by Maikel Isidro Jr., MD at 2017  7:51 AM      Martha Oliveros MD at 2017  9:38 AM  Version 1 of 1           Infectious Diseases Progress Note    Martha Oliveros MD     [facility]  [place]: 1 day  Patient Identification:  Name: Bob Henry Jr.  Age: 37 y.o.  Sex: male  :  1980  MRN: [id number]         Primary Care Physician: Milton Vigil MD            Subjective: Feeling somewhat better not as anxious decreased pain in his left elbow and arm and forearm.  Can move the elbow somewhat better.  For MRI after receiving  IV Valium.  Interval History: See consultation note.    Objective:    Scheduled Meds:  buPROPion  mg Oral Daily   diltiaZEM  mg Oral Q24H   flecainide 100 mg Oral Q12H   gabapentin 300 mg Oral 4x Daily   vancomycin 1,250 mg Intravenous Q12H     Continuous Infusions:  Pharmacy to dose vancomycin     sodium chloride 75 mL/hr Last Rate: 75 mL/hr (17 [id number])       Vital signs in last 24 hours:  Temp:  [98.9 °F (37.2  "°C)-101.6 °F (38.7 °C)] 98.9 °F (37.2 °C)  Heart Rate:  [74-85] 82  Resp:  [16-18] 16  BP: (121-137)/(65-80) 126/78    Intake/Output:    Intake/Output Summary (Last 24 hours) at 09/05/17 0938  Last data filed at 09/05/17 0830   Gross per 24 hour   Intake                0 ml   Output             1325 ml   Net            -1325 ml       Exam:  /78 (BP Location: Right arm, Patient Position: Lying)  Pulse 82  Temp 98.9 °F (37.2 °C) (Oral)   Resp 16  Ht 72\" (182.9 cm)  Wt 160 lb (72.6 kg)  SpO2 95%  BMI 21.7 kg/m2    General Appearance:    Alert, cooperative, no distress, AAOx3, Does not appear to be toxic or septic                          Head:    Normocephalic, without obvious abnormality, atraumatic                           Eyes:    PERRL, conjunctiva/corneas clear, EOM's intact, both eyes                         Throat:   Lips, tongue, gums normal; oral mucosa pink and moist                           Neck:   Supple, symmetrical, trachea midline, no JVD                         Lungs:    Clear to auscultation bilaterally, respirations unlabored                 Chest Wall:    No tenderness or deformity                          Heart:    Regular rate and rhythm, S1 and S2 normal, no murmur,no  Rub                                      or gallop                  Abdomen:     Soft, non-tender, bowel sounds active, no masses, no                                                        organomegaly                  Extremities:   Decrease erythema and warmth of the forearm elbow and distal arm                        Pulses:   Pulses palpable in all extremities                            Skin:   Skin is warm and dry,  no rashes or palpable lesions                Data Review:    I reviewed the patient's new clinical results.    Results from last 7 days  Lab Units 09/04/17  0251 09/03/17  2248   WBC 10*3/mm3 10.69 15.52*   HEMOGLOBIN g/dL 11.6* 13.7   PLATELETS 10*3/mm3 137* 158       Results from last 7 days  Lab " Units 09/05/17  0315 09/04/17  0251 09/03/17 2248   SODIUM mmol/L  --  137 137   POTASSIUM mmol/L  --  3.6 3.6   CHLORIDE mmol/L  --  102 97*   CO2 mmol/L  --  22.8 25.0   BUN mg/dL  --  26* 29*   CREATININE mg/dL 1.08 1.66* 2.09*   CALCIUM mg/dL  --  7.6* 9.2   GLUCOSE mg/dL  --  94 93       Microbiology Results (last 10 days)     Procedure Component Value - Date/Time    Blood Culture [[id number]]  (Normal) Collected:  09/04/17 0251    Lab Status:  Preliminary result Specimen:  Blood from Hand, Right Updated:  09/05/17 0302     Blood Culture No growth at 24 hours    Blood Culture [[id number]]  (Normal) Collected:  09/03/17 2248    Lab Status:  Preliminary result Specimen:  Blood from Arm, Right Updated:  09/04/17 2301     Blood Culture No growth at 24 hours          Assessment:  Active Problems:  Cellulitis of the left arm with possible olecranon bursitis and abscess.  Doubt septic arthritis.    MOUNIKA (acute kidney injury)    SVT (supraventricular tachycardia)      Plan:  Continue IV Vancomycin, follow up on MRI results and continue to elevate arm above the level of the heart.  This on MRI finding will decide if I&D of abscess as needed and that would help us decide the duration of antibiotic treatment.    Martha Oliveros MD  9/5/2017  9:38 AM    Much of this encounter note is an electronic transcription/translation of spoken language to printed text. The electronic translation of spoken language may permit erroneous, or at times, nonsensical words or phrases to be inadvertently transcribed; Although I have reviewed the note for such errors, some may still exist       Electronically signed by Martha Oliveros MD at 9/5/2017 10:28 AM           Consult Notes      Martha Oliveros MD at 9/4/2017  7:36 AM  Version 2 of 2     Consult Orders:    1. Inpatient Consult to Infectious Diseases [[id number]] ordered by Navjot Guzmán MD at 09/04/17 0131                CONSULT NOTE    Infectious Diseases - Martha Oliveros. MD  [facility]    [facility]       Patient Identification:  Name: Bob Henry Jr.  Age: 37 y.o.  Sex: male  :  1980  MRN: [id number]             Date of Consultation: 2017        Primary Care Physician: Milton Vigil MD                               Requesting Physician: Navjot Guzmán MD  Reason for Consultation: Sepsis with left elbow cellulitis    Impression: 37-year-old male with:  1-left elbow soft tissue infection with folliculitis/carbuncle with associated forearm and arm cellulitis likely due to strep/MRSA  2-acute on chronic renal failure multifactorial  3-history of SVT on flecainide  4-severe claustrophobia and anxiety      Recommendations/Discussions: At this juncture would recommend to continue vancomycin follow-up on the blood cultures and renal function.  Orthopedic consultation and MRI of the shoulder has been requested and follow the lead.  The real issue is to differentiate between soft tissue infection versus intra-articular involvement.  Clinically it appears that the joint is spared and most of this is soft tissue infection that may require surgical drainage.  Duration of antibiotic treatment would depend upon whether joint is involved or not.  Thank you Dr. Guzmán for letting me be the part of the patient care please see above impression and recommendation        History of Present Illness:   Patient is 37-year-old male who is past medical history as listed below was in his usual state of his health until Friday evening when he noticed a pimple on his left elbow.  Patient squeezed on those pimples and as the night went on his left buttock become painful and was having fever and chills.  He was miserable on Saturday and went to [facility] with a try to drain it and then given some oral antibiotics.  He was given Bactrim.  Patient continued to feel poorly and by yesterday evening he was miserable and having fever and chills.  His redness and swelling in the tightness of the forearm and  arm continued to get worse.  With that he arrived to the emergency room where he was found to be in acute renal failure with sepsis and leukocytosis .  Patient doesn't recall trauma or injury or exposure to any sick contact.    Past Medical History:  Past Medical History:   Diagnosis Date   • Irregular heart beat    • Kidney stone    • Restless leg syndrome      Past Surgical History:  Past Surgical History:   Procedure Laterality Date   • KIDNEY STONE SURGERY     • SHOULDER SURGERY        Home Meds:  Prescriptions Prior to Admission   Medication Sig Dispense Refill Last Dose   • ALPRAZolam (XANAX) 0.5 MG tablet    More than a month at Unknown time   • buPROPion SR (WELLBUTRIN SR) 150 MG 12 hr tablet Take 150 mg by mouth.   9/2/2017 at Unknown time   • diltiaZEM CD (CARDIZEM CD) 120 MG 24 hr capsule Take 120 mg by mouth.   9/2/2017 at Unknown time   • diltiaZEM CD (CARDIZEM CD) 120 MG 24 hr capsule Take 120 mg by mouth.      • doxycycline (MONODOX) 100 MG capsule Take 1 capsule by mouth 2 (Two) Times a Day. 20 capsule 0    • flecainide (TAMBOCOR) 100 MG tablet Take 100 mg by mouth.   9/2/2017 at Unknown time   • gabapentin (NEURONTIN) 300 MG capsule Take 300 mg by mouth.   9/2/2017 at Unknown time   • HYDROcodone-acetaminophen (NORCO)  MG per tablet Take 1 tablet by mouth Every 6 (Six) Hours As Needed for Moderate Pain . 20 tablet 0    • ibuprofen (ADVIL,MOTRIN) 800 MG tablet    9/2/2017 at Unknown time   • sulfamethoxazole-trimethoprim (BACTRIM DS,SEPTRA DS) 800-160 MG per tablet Take 2 tablets by mouth 2 times a day for 10 days 40 tablet 0      Current Meds:     Current Facility-Administered Medications:   •  acetaminophen (TYLENOL) tablet 650 mg, 650 mg, Oral, Q4H PRN, Navjot Guzmán MD  •  bisacodyl (DULCOLAX) EC tablet 5 mg, 5 mg, Oral, Daily PRN, Navjot Guzmán MD  •  bisacodyl (DULCOLAX) suppository 10 mg, 10 mg, Rectal, Daily PRN, Navjot Guzmán MD  •  buPROPion SR (WELLBUTRIN SR) 12 hr  tablet 150 mg, 150 mg, Oral, Daily, Navjot Guzmán MD  •  diltiaZEM CD (CARDIZEM CD) 24 hr capsule 120 mg, 120 mg, Oral, Q24H, Navjot Guzmán MD  •  flecainide (TAMBOCOR) tablet 100 mg, 100 mg, Oral, Q12H, Navjot Guzmán MD  •  HYDROcodone-acetaminophen (NORCO)  MG per tablet 1 tablet, 1 tablet, Oral, Q4H PRN, Navjot Guzmán MD  •  HYDROmorphone (DILAUDID) injection 0.5 mg, 0.5 mg, Intravenous, Q2H PRN, Navjot Guzmán MD, 0.5 mg at 09/04/17 0426  •  nitroglycerin (NITROSTAT) SL tablet 0.4 mg, 0.4 mg, Sublingual, Q5 Min PRN, Navjot Guzmán MD  •  ondansetron (ZOFRAN) tablet 4 mg, 4 mg, Oral, Q6H PRN **OR** ondansetron ODT (ZOFRAN-ODT) disintegrating tablet 4 mg, 4 mg, Oral, Q6H PRN **OR** ondansetron (ZOFRAN) injection 4 mg, 4 mg, Intravenous, Q6H PRN, Navjot Guzmán MD, 4 mg at 09/04/17 0429  •  Pharmacy to dose vancomycin, , Does not apply, Continuous PRN, Navjot Guzmán MD  •  sodium chloride 0.9 % flush 1-10 mL, 1-10 mL, Intravenous, PRN, Navjot Guzmán MD  •  sodium chloride 0.9 % infusion, 75 mL/hr, Intravenous, Continuous, Navjot Guzmán MD, Last Rate: 75 mL/hr at 09/04/17 0204, 75 mL/hr at 09/04/17 0204  •  vancomycin (VANCOCIN) in iso-osmotic dextrose IVPB 1 g (premix) 200 mL, 1,000 mg, Intravenous, Q12H, Navjot Guzmán MD  Allergies:  No Known Allergies  Social History:   Social History   Substance Use Topics   • Smoking status: Never Smoker   • Smokeless tobacco: Never Used   • Alcohol use No      Family History:  Family History   Problem Relation Age of Onset   • Irregular heart beat Father    • No Known Problems Mother           Review of Systems  See history of present illness and past medical history.   Constitutional: Negative for activity change, appetite change and fever.   HENT: Negative for congestion and sore throat.    Eyes: Negative.    Respiratory: Negative for cough and shortness of breath.    Cardiovascular: Negative for chest pain  "and leg swelling.   Gastrointestinal: Negative for abdominal pain, diarrhea and vomiting.   Endocrine: Negative.    Genitourinary: Negative for decreased urine volume and dysuria.   Musculoskeletal: Positive for myalgias (LUE). Negative for neck pain.        LUE swelling   Skin: Negative for rash and wound.   Allergic/Immunologic: Negative.    Neurological: Negative for weakness, numbness and headaches.   Hematological: Negative.    Psychiatric/Behavioral: Negative.     Vitals:   /74 (BP Location: Right arm, Patient Position: Lying)  Pulse 76  Temp 98.7 °F (37.1 °C) (Oral)   Resp 20  Ht 72\" (182.9 cm)  Wt 160 lb (72.6 kg)  SpO2 97%  BMI 21.7 kg/m2  I/O:   Intake/Output Summary (Last 24 hours) at 09/04/17 0736  Last data filed at 09/04/17 0133   Gross per 24 hour   Intake                0 ml   Output              250 ml   Net             -250 ml     Exam:  General Appearance:    Alert, cooperative, no distress, appears stated age   Head:    Normocephalic, without obvious abnormality, atraumatic   Eyes:    PERRL, conjunctiva/corneas clear, EOM's intact, both eyes   Ears:    Normal external ear canals, both ears   Nose:   Nares normal, septum midline, mucosa normal, no drainage    or sinus tenderness   Throat:   Lips, tongue, gums normal; oral mucosa pink and moist   Neck:   Supple, symmetrical, trachea midline, no adenopathy;     thyroid:  no enlargement/tenderness/nodules; no carotid    bruit or JVD   Back:     Symmetric, no curvature, ROM normal, no CVA tenderness   Lungs:     Clear to auscultation bilaterally, respirations unlabored   Chest Wall:    No tenderness or deformity    Heart:    Regular rate and rhythm, S1 and S2 normal, no murmur, rub   or gallop   Abdomen:     Soft, non-tender, bowel sounds active all four quadrants,     no masses, no hepatomegaly, no splenomegaly   Extremities:   Left elbow has 2 scabbed areas with swelling of the elbow extending to the forearm and arm.  Supination " pronation and flexion extension is intact but limited because of the tense swelling around the forearm and elbow.     Pulses:   Pulses palpable in all extremities; symmetric all extremities   Skin:   Skin color normal, Skin is warm and dry,  no rashes or palpable lesions   Neurologic:   CNII-XII intact, motor strength grossly intact, sensation grossly intact to light touch, no focal deficits noted       Data Review:    I reviewed the patient's new clinical results.    Results from last 7 days  Lab Units 09/04/17  0251 09/03/17  2248   WBC 10*3/mm3 10.69 15.52*   HEMOGLOBIN g/dL 11.6* 13.7   PLATELETS 10*3/mm3 137* 158       Results from last 7 days  Lab Units 09/04/17  0251 09/03/17  2248   SODIUM mmol/L 137 137   POTASSIUM mmol/L 3.6 3.6   CHLORIDE mmol/L 102 97*   CO2 mmol/L 22.8 25.0   BUN mg/dL 26* 29*   CREATININE mg/dL 1.66* 2.09*   CALCIUM mg/dL 7.6* 9.2   GLUCOSE mg/dL 94 93     No results found for: CULTURE]    Assessment:  Active Hospital Problems (** Indicates Principal Problem)    Diagnosis Date Noted   • Pyogenic arthritis of left elbow [M00.9] 09/04/2017   • MOUNIKA (acute kidney injury) [N17.9] 09/04/2017   • SVT (supraventricular tachycardia) [I47.1] 09/04/2017      Resolved Hospital Problems    Diagnosis Date Noted Date Resolved   No resolved problems to display.         Plan:  See above  Martha Oliveros MD   9/4/2017  7:36 AM    Much of this encounter note is an electronic transcription/translation of spoken language to printed text. The electronic translation of spoken language may permit erroneous, or at times, nonsensical words or phrases to be inadvertently transcribed; Although I have reviewed the note for such errors, some may still exist       Electronically signed by Martha Oliveros MD at 9/4/2017 11:08 PM      Martha Oliveros MD at 9/4/2017  7:36 AM  Version 1 of 2         CONSULT NOTE    Infectious Diseases - Martha Rose MD  [facility]       Patient Identification:  Name: Juan  Carl Rubio  Age: 37 y.o.  Sex: male  :  1980  MRN: [id number]             Date of Consultation: 2017        Primary Care Physician: Milton Vigil MD                               Requesting Physician: Navjot Guzmán MD  Reason for Consultation: Sepsis with left elbow cellulitis    Impression: 37-year-old male with:  1-left elbow soft tissue infection with folliculitis/carbuncle with associated forearm and arm cellulitis likely due to strep/MRSA  2-acute on chronic renal failure multifactorial  3-history of SVT on flecainide  4-severe claustrophobia and anxiety      Recommendations/Discussions: At this juncture would recommend to continue vancomycin follow-up on the blood cultures and renal function.  Orthopedic consultation and MRI of the shoulder has been requested and follow the lead.  The real issue is to differentiate between soft tissue infection versus intra-articular involvement.  Clinically it appears that the joint is spared and most of this is soft tissue infection that may require surgical drainage.  Duration of antibiotic treatment would depend upon whether joint is involved or not.  Thank you Dr. Guzmán for letting me be the part of the patient care please see above impression and recommendation        History of Present Illness:   Patient is 37-year-old male who is past medical history as listed below was in his usual state of his health until Friday evening when he noticed a pimple on his left elbow.  Patient squeezed on those pimples and as the night went on his left buttock become painful and was having fever and chills.  He was miserable on Saturday and went to [facility] with a try to drain it and then given some oral antibiotics.  He was given Bactrim.  Patient continued to feel poorly and by yesterday evening he was miserable and having fever and chills.  His redness and swelling in the tightness of the forearm and arm continued to get worse.  With that he arrived to the emergency  room where he was found to be in acute renal failure with sepsis and leukocytosis .  Patient doesn't recall trauma or injury or exposure to any sick contact.    Past Medical History:  Past Medical History:   Diagnosis Date   • Irregular heart beat    • Kidney stone    • Restless leg syndrome      Past Surgical History:  Past Surgical History:   Procedure Laterality Date   • KIDNEY STONE SURGERY     • SHOULDER SURGERY        Home Meds:  Prescriptions Prior to Admission   Medication Sig Dispense Refill Last Dose   • ALPRAZolam (XANAX) 0.5 MG tablet    More than a month at Unknown time   • buPROPion SR (WELLBUTRIN SR) 150 MG 12 hr tablet Take 150 mg by mouth.   9/2/2017 at Unknown time   • diltiaZEM CD (CARDIZEM CD) 120 MG 24 hr capsule Take 120 mg by mouth.   9/2/2017 at Unknown time   • diltiaZEM CD (CARDIZEM CD) 120 MG 24 hr capsule Take 120 mg by mouth.      • doxycycline (MONODOX) 100 MG capsule Take 1 capsule by mouth 2 (Two) Times a Day. 20 capsule 0    • flecainide (TAMBOCOR) 100 MG tablet Take 100 mg by mouth.   9/2/2017 at Unknown time   • gabapentin (NEURONTIN) 300 MG capsule Take 300 mg by mouth.   9/2/2017 at Unknown time   • HYDROcodone-acetaminophen (NORCO)  MG per tablet Take 1 tablet by mouth Every 6 (Six) Hours As Needed for Moderate Pain . 20 tablet 0    • ibuprofen (ADVIL,MOTRIN) 800 MG tablet    9/2/2017 at Unknown time   • sulfamethoxazole-trimethoprim (BACTRIM DS,SEPTRA DS) 800-160 MG per tablet Take 2 tablets by mouth 2 times a day for 10 days 40 tablet 0      Current Meds:     Current Facility-Administered Medications:   •  acetaminophen (TYLENOL) tablet 650 mg, 650 mg, Oral, Q4H PRN, Navjot Guzmán MD  •  bisacodyl (DULCOLAX) EC tablet 5 mg, 5 mg, Oral, Daily PRN, Navjot Guzmán MD  •  bisacodyl (DULCOLAX) suppository 10 mg, 10 mg, Rectal, Daily PRN, Navjot Guzmán MD  •  buPROPion SR (WELLBUTRIN SR) 12 hr tablet 150 mg, 150 mg, Oral, Daily, Navjot Guzmán MD  •   diltiaZEM CD (CARDIZEM CD) 24 hr capsule 120 mg, 120 mg, Oral, Q24H, Navjot Guzmán MD  •  flecainide (TAMBOCOR) tablet 100 mg, 100 mg, Oral, Q12H, Navjot Guzmán MD  •  HYDROcodone-acetaminophen (NORCO)  MG per tablet 1 tablet, 1 tablet, Oral, Q4H PRN, Navjot Guzmán MD  •  HYDROmorphone (DILAUDID) injection 0.5 mg, 0.5 mg, Intravenous, Q2H PRN, Navjot Guzmán MD, 0.5 mg at 09/04/17 0426  •  nitroglycerin (NITROSTAT) SL tablet 0.4 mg, 0.4 mg, Sublingual, Q5 Min PRN, Navjot Guzmán MD  •  ondansetron (ZOFRAN) tablet 4 mg, 4 mg, Oral, Q6H PRN **OR** ondansetron ODT (ZOFRAN-ODT) disintegrating tablet 4 mg, 4 mg, Oral, Q6H PRN **OR** ondansetron (ZOFRAN) injection 4 mg, 4 mg, Intravenous, Q6H PRN, Navjot Guzmán MD, 4 mg at 09/04/17 0429  •  Pharmacy to dose vancomycin, , Does not apply, Continuous PRN, Navjot Guzmán MD  •  sodium chloride 0.9 % flush 1-10 mL, 1-10 mL, Intravenous, PRN, Navjot Guzmán MD  •  sodium chloride 0.9 % infusion, 75 mL/hr, Intravenous, Continuous, Navjot Guzmán MD, Last Rate: 75 mL/hr at 09/04/17 0204, 75 mL/hr at 09/04/17 0204  •  vancomycin (VANCOCIN) in iso-osmotic dextrose IVPB 1 g (premix) 200 mL, 1,000 mg, Intravenous, Q12H, Navjot Guzmán MD  Allergies:  No Known Allergies  Social History:   Social History   Substance Use Topics   • Smoking status: Never Smoker   • Smokeless tobacco: Never Used   • Alcohol use No      Family History:  Family History   Problem Relation Age of Onset   • Irregular heart beat Father    • No Known Problems Mother           Review of Systems  See history of present illness and past medical history.   Constitutional: Negative for activity change, appetite change and fever.   HENT: Negative for congestion and sore throat.    Eyes: Negative.    Respiratory: Negative for cough and shortness of breath.    Cardiovascular: Negative for chest pain and leg swelling.   Gastrointestinal: Negative for abdominal  "pain, diarrhea and vomiting.   Endocrine: Negative.    Genitourinary: Negative for decreased urine volume and dysuria.   Musculoskeletal: Positive for myalgias (LUE). Negative for neck pain.        LUE swelling   Skin: Negative for rash and wound.   Allergic/Immunologic: Negative.    Neurological: Negative for weakness, numbness and headaches.   Hematological: Negative.    Psychiatric/Behavioral: Negative.     Vitals:   /74 (BP Location: Right arm, Patient Position: Lying)  Pulse 76  Temp 98.7 °F (37.1 °C) (Oral)   Resp 20  Ht 72\" (182.9 cm)  Wt 160 lb (72.6 kg)  SpO2 97%  BMI 21.7 kg/m2  I/O:   Intake/Output Summary (Last 24 hours) at 09/04/17 0736  Last data filed at 09/04/17 0133   Gross per 24 hour   Intake                0 ml   Output              250 ml   Net             -250 ml     Exam:  General Appearance:    Alert, cooperative, no distress, appears stated age   Head:    Normocephalic, without obvious abnormality, atraumatic   Eyes:    PERRL, conjunctiva/corneas clear, EOM's intact, both eyes   Ears:    Normal external ear canals, both ears   Nose:   Nares normal, septum midline, mucosa normal, no drainage    or sinus tenderness   Throat:   Lips, tongue, gums normal; oral mucosa pink and moist   Neck:   Supple, symmetrical, trachea midline, no adenopathy;     thyroid:  no enlargement/tenderness/nodules; no carotid    bruit or JVD   Back:     Symmetric, no curvature, ROM normal, no CVA tenderness   Lungs:     Clear to auscultation bilaterally, respirations unlabored   Chest Wall:    No tenderness or deformity    Heart:    Regular rate and rhythm, S1 and S2 normal, no murmur, rub   or gallop   Abdomen:     Soft, non-tender, bowel sounds active all four quadrants,     no masses, no hepatomegaly, no splenomegaly   Extremities:   Left elbow has 2 scabbed areas with swelling of the elbow extending to the forearm and arm.  Supination pronation and flexion extension is intact but limited because of the " tense swelling around the forearm and elbow.     Pulses:   Pulses palpable in all extremities; symmetric all extremities   Skin:   Skin color normal, Skin is warm and dry,  no rashes or palpable lesions   Neurologic:   CNII-XII intact, motor strength grossly intact, sensation grossly intact to light touch, no focal deficits noted       Data Review:    I reviewed the patient's new clinical results.    Results from last 7 days  Lab Units 09/04/17  0251 09/03/17  2248   WBC 10*3/mm3 10.69 15.52*   HEMOGLOBIN g/dL 11.6* 13.7   PLATELETS 10*3/mm3 137* 158       Results from last 7 days  Lab Units 09/04/17  0251 09/03/17  2248   SODIUM mmol/L 137 137   POTASSIUM mmol/L 3.6 3.6   CHLORIDE mmol/L 102 97*   CO2 mmol/L 22.8 25.0   BUN mg/dL 26* 29*   CREATININE mg/dL 1.66* 2.09*   CALCIUM mg/dL 7.6* 9.2   GLUCOSE mg/dL 94 93     No results found for: CULTURE]    Assessment:  Active Hospital Problems (** Indicates Principal Problem)    Diagnosis Date Noted   • Pyogenic arthritis of left elbow [M00.9] 09/04/2017   • MOUNIKA (acute kidney injury) [N17.9] 09/04/2017   • SVT (supraventricular tachycardia) [I47.1] 09/04/2017      Resolved Hospital Problems    Diagnosis Date Noted Date Resolved   No resolved problems to display.         Plan:  See above  Martha Oliveros MD   9/4/2017  7:36 AM    Much of this encounter note is an electronic transcription/translation of spoken language to printed text. The electronic translation of spoken language may permit erroneous, or at times, nonsensical words or phrases to be inadvertently transcribed; Although I have reviewed the note for such errors, some may still exist       Electronically signed by Martha Oliveros MD at 9/4/2017 11:08 PM      Maikel Isidro Jr., MD at 9/4/2017  7:39 AM  Version 1 of 1     Consult Orders:    1. Inpatient Consult to Orthopedic Surgery [[id number]] ordered by Navjot Guzmán MD at 09/04/17 0112                Inpatient Consult to Orthopedic Surgery  Consult performed  by: CHERELLE VEGA JR  Consult ordered by: MARTHA BOYCE          Patient Care Team:  Milton Vigil MD as PCP - General (Family Medicine)    Chief complaint: left elbow pain    Subjective     History of Present Illness     The patient is a pleasant 37-year-old right hand dominant male who presents with several days of progressive left elbow pain and swelling.  He denies any specific antecedent trauma or injury.  He does report a minor scratch on the proximal forearm proximal 25-7 days ago while working on his car.  He went to a local urgent care this weekend, where a provider attempted drainage of the olecranon bursa.  He reports this was largely unsuccessful.  He was placed on oral antibiotics and told to go to emergency department if he developed increasing pain or fevers.  The patient reports his swelling and pain increased, so he came to the emergency department for evaluation and management.  In the emergency room, his white blood cell count was elevated to 15.  CRP was 39, ESR was 20.  He had evidence of mildly elevated lactic acid at 2.2  He also had evidence of acute renal insufficiency.    He was admitted to the medicine service for antibiotics and resuscitation for presumed early sepsis.  Orthopedics was consulted for evaluation of his left elbow.    Patient does report mild improvement in his symptoms since initiation of antibiotics.  He currently feels well, denies fevers, chills, nausea, vomiting.    White blood cell count is trending down.  It is 11 this morning.  Lactic acid is now normal.  He has been afebrile since admission.    Review of Systems     Past Medical History:   Diagnosis Date   • Irregular heart beat    • Kidney stone    • Restless leg syndrome    ,   Past Surgical History:   Procedure Laterality Date   • KIDNEY STONE SURGERY     • SHOULDER SURGERY     ,   Family History   Problem Relation Age of Onset   • Irregular heart beat Father    • No Known Problems Mother    ,   Social  History   Substance Use Topics   • Smoking status: Never Smoker   • Smokeless tobacco: Never Used   • Alcohol use No   ,   Prescriptions Prior to Admission   Medication Sig Dispense Refill Last Dose   • ALPRAZolam (XANAX) 0.5 MG tablet    More than a month at Unknown time   • buPROPion SR (WELLBUTRIN SR) 150 MG 12 hr tablet Take 150 mg by mouth.   9/2/2017 at Unknown time   • diltiaZEM CD (CARDIZEM CD) 120 MG 24 hr capsule Take 120 mg by mouth.   9/2/2017 at Unknown time   • diltiaZEM CD (CARDIZEM CD) 120 MG 24 hr capsule Take 120 mg by mouth.      • doxycycline (MONODOX) 100 MG capsule Take 1 capsule by mouth 2 (Two) Times a Day. 20 capsule 0    • flecainide (TAMBOCOR) 100 MG tablet Take 100 mg by mouth.   9/2/2017 at Unknown time   • gabapentin (NEURONTIN) 300 MG capsule Take 300 mg by mouth.   9/2/2017 at Unknown time   • HYDROcodone-acetaminophen (NORCO)  MG per tablet Take 1 tablet by mouth Every 6 (Six) Hours As Needed for Moderate Pain . 20 tablet 0    • ibuprofen (ADVIL,MOTRIN) 800 MG tablet    9/2/2017 at Unknown time   • sulfamethoxazole-trimethoprim (BACTRIM DS,SEPTRA DS) 800-160 MG per tablet Take 2 tablets by mouth 2 times a day for 10 days 40 tablet 0    , Scheduled Meds:    buPROPion  mg Oral Daily   diltiaZEM  mg Oral Q24H   flecainide 100 mg Oral Q12H   vancomycin 1,000 mg Intravenous Q12H   , Continuous Infusions:    Pharmacy to dose vancomycin     sodium chloride 75 mL/hr Last Rate: 75 mL/hr (09/04/17 0204)   , PRN Meds:  •  acetaminophen  •  bisacodyl  •  bisacodyl  •  HYDROcodone-acetaminophen  •  HYDROmorphone  •  nitroglycerin  •  ondansetron **OR** ondansetron ODT **OR** ondansetron  •  Pharmacy to dose vancomycin  •  sodium chloride and Allergies:  Review of patient's allergies indicates no known allergies.    Objective      Vital Signs  Temp:  [98.7 °F (37.1 °C)-99.5 °F (37.5 °C)] 98.7 °F (37.1 °C)  Heart Rate:  [75-88] 76  Resp:  [18-20] 20  BP: (113-131)/(68-75)  131/74    Physical Exam  Physical Exam:  Vital signs reviewed.  Constitutional:  Appears well-developed, well nourished.  Nontoxic appearing.  HEENT:  Head: normocephalic, atraumatic  Eyes: EOMI, grossly normal.  No scleral icterus.  Neck: Normal range of motion.  Supple, no tracheal deviation.  Cardiovascular: Regular rate.  Pulmonary: Effort normal, symmetric chest expansion, no labored breathing.  Abdominal: Soft, non distended  Neurological: No gross deficits, oriented to person, place, and time.  Skin: Warm and dry  Psychiatric: Normal mood and affect  Musculoskeletal:    Examination of his left upper extremity shows diffuse swelling and bogginess posterior to the elbow and the olecranon bursa.  The bursa is ballotable.  Mild surrounding erythema.  This is very tender to palpation.  There is evidence of a prior attempted drainage distally with a scabbed needle entry site.  No purulence or drainage.  Evidence of a healing superficial abrasion in the proximal/radial forearm with mild surrounding erythema.    He tolerates considerable range of motion at the elbow.  He tolerates 5-90° of elbow flexion with minimal pain.  He tolerates 40° of supination and 40° of pronation with minimal pain.    He is motor intact distally, AIN/PIN/radial/ulnar/median distribution.  Sensation intact to light touch in radial, ulnar, median distribution.  2+ radial pulse.      Results Review:   Radiographs of the left elbow independently reviewed.  They show prominent soft tissue swelling posteriorly.  No significant evidence of elbow effusion.  Radiocapitellar joint remains well aligned.  No evidence of fracture or dislocation.        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  Although septic arthritis of the elbow remains a consideration, given the relatively large degree of range of motion tolerated by the patient, I think this is less likely.  Given his exam, I would refrain from arthrocentesis at this point as I think the risk of seeding the joint through his septic olecranon bursitis/cellulitis outweighs the benefits at this point.    - Recommend initial medical management with IV antibiotics as directed by infectious disease/medicine for presumed septic bursitis  - Pain control, NSAIDs, elevation, gentle compression  - No current urgent indication for surgery, most cases of septic olecranon bursitis can be treated without surgery.  However, if he fails to improve or develops signs or symptoms of a clear abscess, or clinical scenario becomes more concerning for septic arthritis, he may ultimately require surgical drainage  - Primary team has ordered MRI.  We'll follow-up results, suspect will showed large septic bursitis      Thank you for this consultation.  We'll follow.    Maikel Isidro Jr, MD  09/04/17  7:39 AM           Electronically signed by Maikel Isidro Jr., MD at 9/4/2017  7:53 AM

## 2017-09-05 NOTE — NURSING NOTE
Called [facility] and unable to get an order for another pre med since patient had IV ativan at 0851 this morning. Dr. Verduzco said to see if patient was comfortable to try to go down and have MRI and if not then can have another dose in 6 hours and reschedule the MRI. Patient states that he is comfortable to try and go down and have MRI.

## 2017-09-05 NOTE — NURSING NOTE
Dagoberto TRAN pt has order for valium IV prior to MRI. The hospital is out of stock on valium IV.

## 2017-09-05 NOTE — PLAN OF CARE
Problem: Infection, Risk/Actual (Adult)  Goal: Identify Related Risk Factors and Signs and Symptoms  Outcome: Outcome(s) achieved Date Met:  09/05/17  Goal: Infection Prevention/Resolution  Outcome: Ongoing (interventions implemented as appropriate)

## 2017-09-05 NOTE — PROGRESS NOTES
"Pharmacokinetic Consult - Vancomycin Dosing (Follow-up Note)    Bob Hnery JrKerline is on day 2 pharmacy to dose vancomycin for Cellulitis of the left arm with possible olecranon bursitis and abscess  Pharmacy dosing vancomycin per Dr. Persaud's request.   Goal trough: 15-20 mg/L     Relevant clinical data and objective history reviewed:  37 y.o. male 72\" (182.9 cm) 160 lb (72.6 kg)    Past Medical History:   Diagnosis Date   • Irregular heart beat    • Kidney stone    • Restless leg syndrome      Creatinine   Date Value Ref Range Status   09/05/2017 1.08 0.76 - 1.27 mg/dL Final   09/04/2017 1.66 (H) 0.76 - 1.27 mg/dL Final   09/03/2017 2.09 (H) 0.76 - 1.27 mg/dL Final     BUN   Date Value Ref Range Status   09/04/2017 26 (H) 6 - 20 mg/dL Final     Estimated Creatinine Clearance: 96.2 mL/min (by C-G formula based on Cr of 1.08).    Lab Results   Component Value Date    WBC 10.69 09/04/2017     Temp Readings from Last 3 Encounters:   09/05/17 98.9 °F (37.2 °C) (Oral)   09/02/17 99 °F (37.2 °C) (Tympanic)   06/15/17 97.8 °F (36.6 °C) (Tympanic)     Baseline culture/source/susceptibility:   Microbiology Results        Procedure Component Value Units Date/Time       Blood Culture [[id number]] (Normal) Collected: 09/04/17 0251       Specimen: Blood from Hand, Right Updated: 09/05/17 0302        Blood Culture No growth at 24 hours       Blood Culture [[id number]] (Normal) Collected: 09/03/17 2248       Specimen: Blood from Arm, Right Updated: 09/04/17 2301        Blood Culture No growth at 24 hours       Culture, Routine [[id number]] Collected: 09/02/17 1051       Specimen: Aspirate from Elbow, Left Updated: 09/02/17 1051           IV Anti-Infectives     Ordered     Dose/Rate Route Frequency Start Stop    09/04/17 1124  vancomycin 1250 mg/250 mL 0.9% NS IVPB ([facility])     Ordering Provider:  Martha Oliveros MD    1,250 mg Intravenous Every 12 Hours 09/04/17 1200      09/04/17 0111  Pharmacy to dose vancomycin     Ordering " Provider:  Navjot Guzmán MD     Does not apply Continuous PRN 09/04/17 0111      09/03/17 2351  piperacillin-tazobactam (ZOSYN) 4.5 g in iso-osmotic dextrose 100 mL IVPB (premix)     Ordering Provider:  Oleg Kruse MD    4.5 g  over 0.5 Hours Intravenous Once 09/03/17 2353 09/04/17 0028    09/03/17 2351  vancomycin 1500 mg/500 mL 0.9% NS IVPB ([facility])     Ordering Provider:  Oleg Kruse MD    20 mg/kg × 72.6 kg Intravenous Once 09/03/17 2353 09/04/17 0036         Lab Results   Component Value Date    VANCOTROUGH 9.70 09/05/2017     Lab Results   Component Value Date    VANCORANDOM 9.40 09/04/2017       Assessment/Plan  Vancomycin trough level this am 1027 = 9.7 mcg/ml (low) therefore will bump up dose to 1gm iv q8h. 1st dose of new regimen to start at 2000 tonight. Trough level prior to the 0400 9/7/17 dose. Pharmacy will continue to follow daily while on the vancomycin and adjust dose if level is not therapeutic.    Efren Boyer [facility]

## 2017-09-05 NOTE — PROGRESS NOTES
"Orthopaedic Surgery  Daily Progress Note    /65 (BP Location: Right arm, Patient Position: Lying)  Pulse 74  Temp 99.1 °F (37.3 °C) (Oral)   Resp 16  Ht 72\" (182.9 cm)  Wt 160 lb (72.6 kg)  SpO2 94%  BMI 21.7 kg/m2    Lab Results (last 24 hours)     Procedure Component Value Units Date/Time    Vancomycin, Random [[id number]]  (Normal) Collected:  09/04/17 1011    Specimen:  Blood Updated:  09/04/17 1047     Vancomycin Random 9.40 mcg/mL     Blood Culture [[id number]]  (Normal) Collected:  09/03/17 2248    Specimen:  Blood from Arm, Right Updated:  09/04/17 2301     Blood Culture No growth at 24 hours    Blood Culture [[id number]]  (Normal) Collected:  09/04/17 0251    Specimen:  Blood from Hand, Right Updated:  09/05/17 0302     Blood Culture No growth at 24 hours    Creatinine, Serum [[id number]]  (Normal) Collected:  09/05/17 0315    Specimen:  Blood Updated:  09/05/17 0414     Creatinine 1.08 mg/dL      eGFR Non African Amer 77 mL/min/1.73           Imaging Results (last 24 hours)     Procedure Component Value Units Date/Time    MRI Elbow Left With & Without Contrast [[id number]] Updated:  09/05/17 0651          Patient Care Team:  Milton Vigil MD as PCP - General (Family Medicine)    SUBJECTIVE  Patient got claustrophobic in the MRI, could not stay still; had to be aborted    He reports pain in arm perhaps slightly better    PHYSICAL EXAM   Resting in NAD, nontoxic appearing    Examination of his left upper extremity perhaps mild improvement in diffuse swelling and bogginess posterior to the elbow and the olecranon bursa.  Mild surrounding erythema.  Posterior soft tissues remain tender to palpation.        He tolerates considerable range of motion at the elbow.  He tolerates 5-100° of elbow flexion without much pain.  He tolerates 50° of supination and 50° of pronation without pain.     He is motor intact distally, AIN/PIN/radial/ulnar/median distribution.  Sensation intact to light touch in radial, " ulnar, median distribution.  2+ radial pulse.      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 Although he still has clear clinical evidence of cellulitis and suspected septic bursitis, he still tolerates much more ROM at the elbow joint than I would expect with a septic arthritis. Given his exam, I still think the risk of arthrocentesis and seeding the joint through his septic olecranon bursitis/cellulitis outweighs the benefits at this point.     - Continue IV antibiotics as directed by infectious disease/medicine for presumed septic bursitis  - Pain control, NSAIDs, elevation, gentle compression  - No current urgent indication for surgery, most cases of septic olecranon bursitis can be treated without surgery.  However, if he fails to improve or develops signs or symptoms of a clear abscess, or clinical scenario becomes more concerning for septic arthritis, he may ultimately require surgical drainage  - [facility]  Will follow up results; if shows abscess or large fluid collection may require drainage        Thank you for this consultation.  We'll follow.    Maikel Isidro Jr, MD  09/05/17  7:47 AM

## 2017-09-05 NOTE — NURSING NOTE
Premedicated patient at 0851 for MRI. Was told by night shift RN that MRI called early this morning and told day shift RN to call once patient was premedicated. Called MRI after Ativan was administered and was told that they were unable to get patient and wrong information was communicated to me. Will call and get med ordered if needed later.

## 2017-09-05 NOTE — SIGNIFICANT NOTE
09/05/17 0914   Rehab Evaluation   Evaluation Not Performed other (see comments)  (PT consult discharged this date. PT evaluation not appropriate for patient's clinical presentation at this time. Discussed with RN.)

## 2017-09-05 NOTE — PROGRESS NOTES
" LOS: 1 day     Name: Bob Henry Jr.  Age: 37 y.o.  Sex: male  :  1980  MRN: [id number]         Primary Care Physician: Milton Vigil MD    Subjective   Subjective  Not much change in the left elbow.  It is still, swollen, and painful but he does have fairly preserved range of motion.  Eyes fevers or chills.  Feels hungry.    Objective   Vital Signs  Temp:  [98.9 °F (37.2 °C)-100.4 °F (38 °C)] 98.9 °F (37.2 °C)  Heart Rate:  [74-82] 82  Resp:  [16-18] 16  BP: (121-137)/(65-80) 126/78  Body mass index is 21.7 kg/(m^2).    Objective:  General Appearance:  Comfortable and in no acute distress.    Vital signs: (most recent): Blood pressure 126/78, pulse 82, temperature 98.9 °F (37.2 °C), temperature source Oral, resp. rate 16, height 72\" (182.9 cm), weight 160 lb (72.6 kg), SpO2 95 %.    Lungs:  Normal respiratory rate and normal effort.    Heart: Normal rate.  Regular rhythm.    Abdomen: Abdomen is soft.  Bowel sounds are normal.   There is no abdominal tenderness.     Extremities: There is local extremity swelling.  There is no dependent edema.  (Evidence of effusion of the left elbow with surrounding cellulitis.  Range of motion is fairly well preserved.)  Neurological: Patient is alert and oriented to person, place and time.    Skin:  Warm and dry.              Results Review:       I reviewed the patient's new clinical results.      Results from last 7 days  Lab Units 17  02517  2248   WBC 10*3/mm3 10.69 15.52*   HEMOGLOBIN g/dL 11.6* 13.7   PLATELETS 10*3/mm3 137* 158       Results from last 7 days  Lab Units 17  0315 17  02517  2248   SODIUM mmol/L  --  137 137   POTASSIUM mmol/L  --  3.6 3.6   CHLORIDE mmol/L  --  102 97*   CO2 mmol/L  --  22.8 25.0   BUN mg/dL  --  26* 29*   CREATININE mg/dL 1.08 1.66* 2.09*   CALCIUM mg/dL  --  7.6* 9.2   GLUCOSE mg/dL  --  94 93           Scheduled Meds:     buPROPion  mg Oral Daily   diltiaZEM  mg Oral Q24H "   flecainide 100 mg Oral Q12H   gabapentin 300 mg Oral 4x Daily   vancomycin 1,000 mg Intravenous Q8H     PRN Meds:   •  acetaminophen  •  bisacodyl  •  bisacodyl  •  diazePAM  •  HYDROcodone-acetaminophen  •  HYDROmorphone  •  nitroglycerin  •  ondansetron **OR** ondansetron ODT **OR** ondansetron  •  Pharmacy to dose vancomycin  •  sodium chloride  Continuous Infusions:    Pharmacy to dose vancomycin     sodium chloride 75 mL/hr Last Rate: 75 mL/hr (09/04/17 4757)       Assessment/Plan   Active Problems:    Pyogenic arthritis of left elbow    MOUNIKA (acute kidney injury)    SVT (supraventricular tachycardia)      Assessment & Plan    - MRI has been obtained this afternoon with results pending.  Continue antibiotics per infectious disease recommendation.  Appreciate orthopedic evaluation.  - Renal function has normalized along with white blood cell count.    Nate Verduzco MD  [facility]  09/05/17  2:16 PM

## 2017-09-05 NOTE — DISCHARGE PLACEMENT REQUEST
"Bhumi Henry Jr. (37 y.o. Male)     Date of Birth Social Security Number Address Home Phone MRN    1980  [address] [facility] [address] [phone] [id number]    [employer] Marital Status          None        Admission Date Admission Type Admitting Provider Attending Provider Department, Room/Bed    9/3/17 Emergency Navjot Guzmán MD [place], Robert Russell, MD [address], P780/1    Discharge Date Discharge Disposition Discharge Destination                      Attending Provider: Nate Verduzco MD     Allergies:  No Known Allergies    Isolation:  None   Infection:  None   Code Status:  FULL    Ht:  72\" (182.9 cm)   Wt:  160 lb (72.6 kg)    Admission Cmt:  None   Principal Problem:  None                Active Insurance as of 9/3/2017     Primary Coverage     Payor Plan Insurance Group Employer/Plan Group    [facility] [employer] [id number]     Payor Plan Address Payor Plan Phone Number Effective From Effective To    [address] [phone] 1/1/2015     [place] [address]       Subscriber Name Subscriber Birth Date Member ID       KWANBHUMI JR. 1980 [id number]                 Emergency Contacts      (Rel.) Home Phone Work Phone Mobile Phone    Jenifer Henry (Spouse) [phone] -- --              "

## 2017-09-05 NOTE — PROGRESS NOTES
"  Infectious Diseases Progress Note    Martha Oliveros MD     [facility]  Los: 1 day  Patient Identification:  Name: Bob Henry Jr.  Age: 37 y.o.  Sex: male  :  1980  MRN: [id number]         Primary Care Physician: Milton Vigil MD            Subjective: Feeling somewhat better not as anxious decreased pain in his left elbow and arm and forearm.  Can move the elbow somewhat better.  For MRI after receiving  IV Valium.  Interval History: See consultation note.    Objective:    Scheduled Meds:  buPROPion  mg Oral Daily   diltiaZEM  mg Oral Q24H   flecainide 100 mg Oral Q12H   gabapentin 300 mg Oral 4x Daily   vancomycin 1,250 mg Intravenous Q12H     Continuous Infusions:  Pharmacy to dose vancomycin     sodium chloride 75 mL/hr Last Rate: 75 mL/hr (17 2348)       Vital signs in last 24 hours:  Temp:  [98.9 °F (37.2 °C)-101.6 °F (38.7 °C)] 98.9 °F (37.2 °C)  Heart Rate:  [74-85] 82  Resp:  [16-18] 16  BP: (121-137)/(65-80) 126/78    Intake/Output:    Intake/Output Summary (Last 24 hours) at 17 0938  Last data filed at 17 0830   Gross per 24 hour   Intake                0 ml   Output             1325 ml   Net            -1325 ml       Exam:  /78 (BP Location: Right arm, Patient Position: Lying)  Pulse 82  Temp 98.9 °F (37.2 °C) (Oral)   Resp 16  Ht 72\" (182.9 cm)  Wt 160 lb (72.6 kg)  SpO2 95%  BMI 21.7 kg/m2    General Appearance:    Alert, cooperative, no distress, AAOx3, Does not appear to be toxic or septic                          Head:    Normocephalic, without obvious abnormality, atraumatic                           Eyes:    PERRL, conjunctiva/corneas clear, EOM's intact, both eyes                         Throat:   Lips, tongue, gums normal; oral mucosa pink and moist                           Neck:   Supple, symmetrical, trachea midline, no JVD                         Lungs:    Clear to auscultation bilaterally, respirations unlabored      "            Chest Wall:    No tenderness or deformity                          Heart:    Regular rate and rhythm, S1 and S2 normal, no murmur,no  Rub                                      or gallop                  Abdomen:     Soft, non-tender, bowel sounds active, no masses, no                                                        organomegaly                  Extremities:   Decrease erythema and warmth of the forearm elbow and distal arm                        Pulses:   Pulses palpable in all extremities                            Skin:   Skin is warm and dry,  no rashes or palpable lesions                Data Review:    I reviewed the patient's new clinical results.    Results from last 7 days  Lab Units 09/04/17  0251 09/03/17 2248   WBC 10*3/mm3 10.69 15.52*   HEMOGLOBIN g/dL 11.6* 13.7   PLATELETS 10*3/mm3 137* 158       Results from last 7 days  Lab Units 09/05/17  0315 09/04/17  0251 09/03/17 2248   SODIUM mmol/L  --  137 137   POTASSIUM mmol/L  --  3.6 3.6   CHLORIDE mmol/L  --  102 97*   CO2 mmol/L  --  22.8 25.0   BUN mg/dL  --  26* 29*   CREATININE mg/dL 1.08 1.66* 2.09*   CALCIUM mg/dL  --  7.6* 9.2   GLUCOSE mg/dL  --  94 93       Microbiology Results (last 10 days)     Procedure Component Value - Date/Time    Blood Culture [[id number]]  (Normal) Collected:  09/04/17 0251    Lab Status:  Preliminary result Specimen:  Blood from Hand, Right Updated:  09/05/17 0302     Blood Culture No growth at 24 hours    Blood Culture [[id number]]  (Normal) Collected:  09/03/17 2248    Lab Status:  Preliminary result Specimen:  Blood from Arm, Right Updated:  09/04/17 2301     Blood Culture No growth at 24 hours          Assessment:  Active Problems:  Cellulitis of the left arm with possible olecranon bursitis and abscess.  Doubt septic arthritis.    MOUNIKA (acute kidney injury)    SVT (supraventricular tachycardia)      Plan:  Continue IV Vancomycin, follow up on MRI results and continue to elevate arm above the level  of the heart.  This on MRI finding will decide if I&D of abscess as needed and that would help us decide the duration of antibiotic treatment.    Martha Oliveros MD  9/5/2017  9:38 AM    Much of this encounter note is an electronic transcription/translation of spoken language to printed text. The electronic translation of spoken language may permit erroneous, or at times, nonsensical words or phrases to be inadvertently transcribed; Although I have reviewed the note for such errors, some may still exist

## 2017-09-05 NOTE — PLAN OF CARE
Problem: Patient Care Overview (Adult)  Goal: Plan of Care Review  Outcome: Ongoing (interventions implemented as appropriate)    09/05/17 3198   Coping/Psychosocial Response Interventions   Plan Of Care Reviewed With patient   Patient Care Overview   Progress improving   Outcome Evaluation   Outcome Summary/Follow up Plan yanelis had MRI this am, did not show infection or fluid build up, report says possible cellulitis. Patient started on regular diet, more than likely will not need surgery. PO pain meds started and controlling pain. left elbow still swollen. Heart rate and BP stable this shift, cardizem given this shift now that patient is no longer NPO. Vanc q8hrs now instead of q12, levels this am were a little low. will continue to monitor.          Problem: Pain, Acute (Adult)  Goal: Acceptable Pain Control/Comfort Level  Outcome: Ongoing (interventions implemented as appropriate)    Problem: Fall Risk (Adult)  Goal: Absence of Falls  Outcome: Ongoing (interventions implemented as appropriate)

## 2017-09-06 VITALS
HEIGHT: 72 IN | SYSTOLIC BLOOD PRESSURE: 125 MMHG | RESPIRATION RATE: 16 BRPM | BODY MASS INDEX: 22.37 KG/M2 | TEMPERATURE: 99 F | WEIGHT: 165.2 LBS | OXYGEN SATURATION: 98 % | HEART RATE: 72 BPM | DIASTOLIC BLOOD PRESSURE: 76 MMHG

## 2017-09-06 PROBLEM — M00.9 PYOGENIC ARTHRITIS OF LEFT ELBOW: Status: RESOLVED | Noted: 2017-09-04 | Resolved: 2017-09-06

## 2017-09-06 PROBLEM — L03.114 CELLULITIS OF LEFT UPPER EXTREMITY: Status: ACTIVE | Noted: 2017-09-06

## 2017-09-06 LAB
ANION GAP SERPL CALCULATED.3IONS-SCNC: 14 MMOL/L
BASOPHILS # BLD AUTO: 0.02 10*3/MM3 (ref 0–0.2)
BASOPHILS NFR BLD AUTO: 0.2 % (ref 0–1.5)
BUN BLD-MCNC: 10 MG/DL (ref 6–20)
BUN/CREAT SERPL: 10.3 (ref 7–25)
CALCIUM SPEC-SCNC: 8.2 MG/DL (ref 8.6–10.5)
CHLORIDE SERPL-SCNC: 99 MMOL/L (ref 98–107)
CO2 SERPL-SCNC: 23 MMOL/L (ref 22–29)
CREAT BLD-MCNC: 0.97 MG/DL (ref 0.76–1.27)
DEPRECATED RDW RBC AUTO: 42 FL (ref 37–54)
EOSINOPHIL # BLD AUTO: 0.26 10*3/MM3 (ref 0–0.7)
EOSINOPHIL NFR BLD AUTO: 3.2 % (ref 0.3–6.2)
ERYTHROCYTE [DISTWIDTH] IN BLOOD BY AUTOMATED COUNT: 12.8 % (ref 11.5–14.5)
GFR SERPL CREATININE-BSD FRML MDRD: 87 ML/MIN/1.73
GLUCOSE BLD-MCNC: 127 MG/DL (ref 65–99)
HCT VFR BLD AUTO: 31.8 % (ref 40.4–52.2)
HGB BLD-MCNC: 10.8 G/DL (ref 13.7–17.6)
IMM GRANULOCYTES # BLD: 0.05 10*3/MM3 (ref 0–0.03)
IMM GRANULOCYTES NFR BLD: 0.6 % (ref 0–0.5)
LYMPHOCYTES # BLD AUTO: 0.83 10*3/MM3 (ref 0.9–4.8)
LYMPHOCYTES NFR BLD AUTO: 10.1 % (ref 19.6–45.3)
MCH RBC QN AUTO: 30.4 PG (ref 27–32.7)
MCHC RBC AUTO-ENTMCNC: 34 G/DL (ref 32.6–36.4)
MCV RBC AUTO: 89.6 FL (ref 79.8–96.2)
MONOCYTES # BLD AUTO: 0.96 10*3/MM3 (ref 0.2–1.2)
MONOCYTES NFR BLD AUTO: 11.7 % (ref 5–12)
NEUTROPHILS # BLD AUTO: 6.09 10*3/MM3 (ref 1.9–8.1)
NEUTROPHILS NFR BLD AUTO: 74.2 % (ref 42.7–76)
PLATELET # BLD AUTO: 167 10*3/MM3 (ref 140–500)
PMV BLD AUTO: 11.1 FL (ref 6–12)
POTASSIUM BLD-SCNC: 3.3 MMOL/L (ref 3.5–5.2)
RBC # BLD AUTO: 3.55 10*6/MM3 (ref 4.6–6)
SODIUM BLD-SCNC: 136 MMOL/L (ref 136–145)
WBC NRBC COR # BLD: 8.21 10*3/MM3 (ref 4.5–10.7)

## 2017-09-06 PROCEDURE — 80048 BASIC METABOLIC PNL TOTAL CA: CPT | Performed by: INTERNAL MEDICINE

## 2017-09-06 PROCEDURE — 25010000002 VANCOMYCIN PER 500 MG: Performed by: INTERNAL MEDICINE

## 2017-09-06 PROCEDURE — 85025 COMPLETE CBC W/AUTO DIFF WBC: CPT | Performed by: INTERNAL MEDICINE

## 2017-09-06 RX ORDER — CEPHALEXIN 500 MG/1
500 CAPSULE ORAL 3 TIMES DAILY
Qty: 30 CAPSULE | Refills: 0 | Status: SHIPPED | OUTPATIENT
Start: 2017-09-06 | End: 2017-09-16

## 2017-09-06 RX ORDER — HYDROCODONE BITARTRATE AND ACETAMINOPHEN 5; 325 MG/1; MG/1
1 TABLET ORAL EVERY 6 HOURS PRN
Qty: 8 TABLET | Refills: 0 | Status: SHIPPED | OUTPATIENT
Start: 2017-09-06 | End: 2017-09-08

## 2017-09-06 RX ORDER — DOXYCYCLINE HYCLATE 50 MG/1
100 CAPSULE ORAL 2 TIMES DAILY
Qty: 40 CAPSULE | Refills: 0 | Status: SHIPPED | OUTPATIENT
Start: 2017-09-06 | End: 2017-09-16

## 2017-09-06 RX ADMIN — GABAPENTIN 300 MG: 300 CAPSULE ORAL at 12:54

## 2017-09-06 RX ADMIN — HYDROCODONE BITARTRATE AND ACETAMINOPHEN 1 TABLET: 10; 325 TABLET ORAL at 08:52

## 2017-09-06 RX ADMIN — BUPROPION HYDROCHLORIDE 150 MG: 150 TABLET, FILM COATED ORAL at 08:52

## 2017-09-06 RX ADMIN — FLECAINIDE ACETATE 100 MG: 50 TABLET ORAL at 08:52

## 2017-09-06 RX ADMIN — GABAPENTIN 300 MG: 300 CAPSULE ORAL at 08:52

## 2017-09-06 RX ADMIN — DILTIAZEM HYDROCHLORIDE 120 MG: 120 CAPSULE, COATED, EXTENDED RELEASE ORAL at 08:52

## 2017-09-06 RX ADMIN — HYDROCODONE BITARTRATE AND ACETAMINOPHEN 1 TABLET: 10; 325 TABLET ORAL at 02:29

## 2017-09-06 RX ADMIN — VANCOMYCIN HYDROCHLORIDE 1000 MG: 1 INJECTION, SOLUTION INTRAVENOUS at 05:34

## 2017-09-06 RX ADMIN — HYDROCODONE BITARTRATE AND ACETAMINOPHEN 1 TABLET: 10; 325 TABLET ORAL at 12:54

## 2017-09-06 RX ADMIN — VANCOMYCIN HYDROCHLORIDE 1000 MG: 1 INJECTION, SOLUTION INTRAVENOUS at 12:48

## 2017-09-06 NOTE — DISCHARGE SUMMARY
Date of Admission: 9/3/2017  Date of Discharge:  9/6/2017  Primary Care Physician: Milton Vigil MD     Discharge Diagnosis:  Principal Problem:    Cellulitis of left upper extremity  Active Problems:    MOUNIKA (acute kidney injury)    SVT (supraventricular tachycardia)      DETAILS OF HOSPITAL STAY     Pertinent Test Results and Procedures Performed    MRI of the left elbow:  Cellulitis at the left elbow. There is no evidence of  abscess, osteomyelitis, or septic arthritis.    X-ray of the left elbow showed no acute osseous abnormality    Blood cultures are negative at 2 days    Hospital Course    This is a 37-year-old white male who presented to the emergency room with implants of swelling and warmth around the left elbow.  Please see H&P for full details of admission.  He was admitted and started on antibiotics in the form of vancomycin and Zosyn.  He was evaluated by infectious disease and orthopedic surgery.  He had an MRI of the left elbow as described above.  Ultimately they showed cellulitis and soft tissue involvement but no evidence of abscess, osteomyelitis, or septic arthritis.  The patient's elbow pain, swelling, and warmth along with range of motion have started to improve with initiation of IV antibiotics and elevation of the arm.  Orthopedic surgery and infectious disease recommended continuation of antibiotics and I have spoken with Dr. Oliveros today who states the patients can be transitioned to oral antibiotics in the form of doxycycline and Keflex.  He will receive 12 total days of antibiotics for skin and soft tissue infection.  I have discussed plans for discharge with the patient today and is in agreement with this plan.  He is medically stable for discharge.    Physical Exam at Discharge:  General: No acute distress, AAOx3  HEENT: EOMI, PERRL  Cardiovascular: +s1 and s2, RRR  Lungs: No rhonchi or wheezing  Abdomen: soft, nontender    Consults:   Consults     Date and Time Order Name Status  Description    9/4/2017 0131 Inpatient Consult to Infectious Diseases Completed     9/4/2017 0112 Inpatient Consult to Orthopedic Surgery Completed     9/3/2017 2021 [facility] (on-call MD unless specified) Completed             Condition on Discharge: Stable    Discharge Disposition  Home or Self Care    Discharge Medications   Bob Henry Jr.   Home Medication Instructions HANDY:[id number]    Printed on:09/06/17 1250   Medication Information                      ALPRAZolam (XANAX) 0.5 MG tablet               buPROPion SR (WELLBUTRIN SR) 150 MG 12 hr tablet  Take 150 mg by mouth.             cephalexin (KEFLEX) 500 MG capsule  Take 1 capsule by mouth 3 (Three) Times a Day for 10 days.             diltiaZEM CD (CARDIZEM CD) 120 MG 24 hr capsule  Take 120 mg by mouth.             doxycycline (VIBRAMYCIN) 50 MG capsule  Take 2 capsules by mouth 2 (Two) Times a Day for 10 days.             flecainide (TAMBOCOR) 100 MG tablet  Take 100 mg by mouth.             gabapentin (NEURONTIN) 300 MG capsule  Take 300 mg by mouth.             HYDROcodone-acetaminophen (NORCO) 5-325 MG per tablet  Take 1 tablet by mouth Every 6 (Six) Hours As Needed for Severe Pain  for up to 2 days.             ibuprofen (ADVIL,MOTRIN) 800 MG tablet                   Discharge Diet:   Diet Instructions     Diet: Regular; Thin       Discharge Diet:  Regular   Fluid Consistency:  Thin                 Activity at Discharge:   Activity Instructions     Activity as Tolerated                     Follow-up Appointments  No future appointments.  Additional Instructions for the Follow-ups that You Need to Schedule     Discharge Follow-up with PCP    As directed    Follow Up Details:  10 days                 Test Results Pending at Discharge   Order Current Status    Blood Culture Preliminary result    Blood Culture Preliminary result          I have examined and discussed discharge planning with the patient today.     Nate Verduzco,  MD  09/06/17  12:50 PM    Time: Discharge less than 30 min

## 2017-09-06 NOTE — PLAN OF CARE
Problem: Patient Care Overview (Adult)  Goal: Plan of Care Review  Outcome: Ongoing (interventions implemented as appropriate)    09/06/17 0306   Coping/Psychosocial Response Interventions   Plan Of Care Reviewed With patient   Patient Care Overview   Progress improving   Outcome Evaluation   Outcome Summary/Follow up Plan patient resting comfortably through night, pain controlled with oral pain medication at this time, patient educated on b/p monitoring with medications       Goal: Adult Individualization and Mutuality  Outcome: Ongoing (interventions implemented as appropriate)  Goal: Discharge Needs Assessment  Outcome: Ongoing (interventions implemented as appropriate)    Problem: Pain, Acute (Adult)  Goal: Acceptable Pain Control/Comfort Level  Outcome: Ongoing (interventions implemented as appropriate)    Problem: Fall Risk (Adult)  Goal: Absence of Falls  Outcome: Ongoing (interventions implemented as appropriate)    Problem: Infection, Risk/Actual (Adult)  Goal: Infection Prevention/Resolution  Outcome: Ongoing (interventions implemented as appropriate)

## 2017-09-06 NOTE — PROGRESS NOTES
"Orthopaedic Surgery  Daily Progress Note    /86 (BP Location: Right arm, Patient Position: Sitting)  Pulse 70  Temp 97.8 °F (36.6 °C) (Oral)   Resp 16  Ht 72\" (182.9 cm)  Wt 165 lb 3.2 oz (74.9 kg)  SpO2 96%  BMI 22.41 kg/m2    Lab Results (last 24 hours)     Procedure Component Value Units Date/Time    Vancomycin, Trough [[id number]]  (Normal) Collected:  09/05/17 1027    Specimen:  Blood Updated:  09/05/17 1130     Vancomycin Trough 9.70 mcg/mL     CBC (No Diff) [[id number]]  (Abnormal) Collected:  09/05/17 1414    Specimen:  Blood Updated:  09/05/17 1436     WBC 9.63 10*3/mm3      RBC 3.55 (L) 10*6/mm3      Hemoglobin 10.9 (L) g/dL      Hematocrit 31.9 (L) %      MCV 89.9 fL      MCH 30.7 pg      MCHC 34.2 g/dL      RDW 12.8 %      RDW-SD 42.2 fl      MPV 10.9 fL      Platelets 139 (L) 10*3/mm3     Blood Culture [[id number]]  (Normal) Collected:  09/03/17 2248    Specimen:  Blood from Arm, Right Updated:  09/05/17 2301     Blood Culture No growth at 2 days    Blood Culture [[id number]]  (Normal) Collected:  09/04/17 0251    Specimen:  Blood from Hand, Right Updated:  09/06/17 0303     Blood Culture No growth at 2 days    CBC & Differential [[id number]] Collected:  09/06/17 0325    Specimen:  Blood Updated:  09/06/17 0424    Narrative:       The following orders were created for panel order CBC & Differential.  Procedure                               Abnormality         Status                     ---------                               -----------         ------                     CBC Auto Differential[[id number]]        Abnormal            Final result                 Please view results for these tests on the individual orders.    CBC Auto Differential [[id number]]  (Abnormal) Collected:  09/06/17 0325    Specimen:  Blood Updated:  09/06/17 0424     WBC 8.21 10*3/mm3      RBC 3.55 (L) 10*6/mm3      Hemoglobin 10.8 (L) g/dL      Hematocrit 31.8 (L) %      MCV 89.6 fL      MCH 30.4 pg      MCHC 34.0 g/dL  "     RDW 12.8 %      RDW-SD 42.0 fl      MPV 11.1 fL      Platelets 167 10*3/mm3      Neutrophil % 74.2 %      Lymphocyte % 10.1 (L) %      Monocyte % 11.7 %      Eosinophil % 3.2 %      Basophil % 0.2 %      Immature Grans % 0.6 (H) %      Neutrophils, Absolute 6.09 10*3/mm3      Lymphocytes, Absolute 0.83 (L) 10*3/mm3      Monocytes, Absolute 0.96 10*3/mm3      Eosinophils, Absolute 0.26 10*3/mm3      Basophils, Absolute 0.02 10*3/mm3      Immature Grans, Absolute 0.05 (H) 10*3/mm3     Basic Metabolic Panel [[id number]]  (Abnormal) Collected:  09/06/17 0325    Specimen:  Blood Updated:  09/06/17 0504     Glucose 127 (H) mg/dL      BUN 10 mg/dL      Creatinine 0.97 mg/dL      Sodium 136 mmol/L      Potassium 3.3 (L) mmol/L      Chloride 99 mmol/L      CO2 23.0 mmol/L      Calcium 8.2 (L) mg/dL      eGFR Non African Amer 87 mL/min/1.73      BUN/Creatinine Ratio 10.3     Anion Gap 14.0 mmol/L     Narrative:       GFR Normal >60  Chronic Kidney Disease <60  Kidney Failure <15          Imaging Results (last 24 hours)     Procedure Component Value Units Date/Time    MRI Elbow Left With & Without Contrast [[id number]] Collected:  09/05/17 1516     Updated:  09/05/17 1722    Narrative:       LEFT ELBOW MRI WITH AND WITHOUT CONTRAST     HISTORY: Left upper extremity swelling for several days. The patient  reportedly had drainage of the bursa a few days ago. Decreased range of  motion.     TECHNIQUE: MRI of the left elbow was performed before and after IV  gadolinium administration and is correlated with x-rays performed  earlier the same day.     FINDINGS: There is diffuse edema and enhancement of the subcutaneous fat  around the left elbow. The subcutaneous edema is most pronounced over  the dorsum of the elbow. However, there is no bursal fluid collection or  abscess.     Marrow signal is normal. The musculature around the elbow is normal in  signal and bulk. There is no muscular edema. There is a tiny, normal  volume of  joint fluid at the elbow, without any abnormal synovial  thickening or enhancement. The flexor and extensor tendons are intact.  Radial and ulnar collateral ligaments appear normal. The cubital tunnel  and its contents are normal.       Impression:       Cellulitis at the left elbow. There is no evidence of  abscess, osteomyelitis, or septic arthritis.     This report was finalized on 9/5/2017 5:19 PM by Dr. Maikel Rod MD.             Patient Care Team:  Milton Vigil MD as PCP - General (Family Medicine)    SUBJECTIVE  Patient reports pain is now steadily improving    PHYSICAL EXAM   Resting in NAD, nontoxic appearing    Examination of his left upper extremity continued improvement in diffuse swelling and bogginess posterior to the elbow and the olecranon bursa.  Minimal erythema.  Posterior soft tissues remain tender to palpation.         He tolerates 0-120° of elbow flexion without much pain.  He tolerates 50° of supination and 50° of pronation without pain.     He is motor intact distally, AIN/PIN/radial/ulnar/median distribution.  Sensation intact to light touch in radial, ulnar, median distribution.  2+ radial pulse.      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     MRI does not show any evidence of septic arthritis, septic bursitis, or drainable fluid collection.    WBC normalized.     - Continue IV antibiotics as directed by infectious disease/medicine for cellulitis  - Pain control, NSAIDs, elevation, gentle compression  - No current indication for surgery/drainage            Maikel Isidro Jr, MD  09/06/17  7:48 AM

## 2017-09-06 NOTE — PROGRESS NOTES
Case Management Discharge Note    Final Note: Pt d/c home on PO abx, [facility] aware. D/c home no needs.     Discharge Placement     Facility/Agency Request Status Selected? Address Phone Number Fax Number    [facility] Accepted    Yes [address] [address] [phone]             Discharge Codes: 01  Discharge to home

## 2017-09-06 NOTE — PROGRESS NOTES
"  Infectious Diseases Progress Note    Martha Oliveros MD     [facility]  Los: 2 days  Patient Identification:  Name: Bob Henry Jr.  Age: 37 y.o.  Sex: male  :  1980  MRN: [id number]         Primary Care Physician: Milton Vigil MD            Subjective: Feeling much better decreased pain and swelling of the arm.  Relieved with the result of MRI showing no evidence of septic arthritis osteomyelitis.  Interval History: See consultation note.    Objective:    Scheduled Meds:    buPROPion  mg Oral Daily   diltiaZEM  mg Oral Q24H   flecainide 100 mg Oral Q12H   gabapentin 300 mg Oral 4x Daily   vancomycin 1,000 mg Intravenous Q8H     Continuous Infusions:    Pharmacy to dose vancomycin     sodium chloride 75 mL/hr Last Rate: 75 mL/hr (17 2348)       Vital signs in last 24 hours:  Temp:  [97.6 °F (36.4 °C)-100.5 °F (38.1 °C)] 97.8 °F (36.6 °C)  Heart Rate:  [70-86] 70  Resp:  [16] 16  BP: (121-133)/(63-86) 133/86    Intake/Output:    Intake/Output Summary (Last 24 hours) at 17 0816  Last data filed at 17 0600   Gross per 24 hour   Intake                0 ml   Output             2950 ml   Net            -2950 ml       Exam:  /86 (BP Location: Right arm, Patient Position: Sitting)  Pulse 70  Temp 97.8 °F (36.6 °C) (Oral)   Resp 16  Ht 72\" (182.9 cm)  Wt 165 lb 3.2 oz (74.9 kg)  SpO2 96%  BMI 22.41 kg/m2    General Appearance:    Alert, cooperative, no distress, AAOx3, Does not appear to be toxic or septic                          Head:    Normocephalic, without obvious abnormality, atraumatic                           Eyes:    PERRL, conjunctiva/corneas clear, EOM's intact, both eyes                         Throat:   Lips, tongue, gums normal; oral mucosa pink and moist                           Neck:   Supple, symmetrical, trachea midline, no JVD                         Lungs:    Clear to auscultation bilaterally, respirations unlabored              "    Chest Wall:    No tenderness or deformity                          Heart:    Regular rate and rhythm, S1 and S2 normal, no murmur,no  Rub                                      or gallop                  Abdomen:     Soft, non-tender, bowel sounds active, no masses, no                                                        organomegaly                  Extremities:   Forearm significantly improved decrease erythema and warmth of the left arm and elbow                        Pulses:   Pulses palpable in all extremities                            Skin:   Skin is warm and dry,  no rashes or palpable lesions                Data Review:    I reviewed the patient's new clinical results.    Results from last 7 days  Lab Units 09/06/17  0325 09/05/17  1414 09/04/17  0251 09/03/17  2248   WBC 10*3/mm3 8.21 9.63 10.69 15.52*   HEMOGLOBIN g/dL 10.8* 10.9* 11.6* 13.7   PLATELETS 10*3/mm3 167 139* 137* 158       Results from last 7 days  Lab Units 09/06/17  0325 09/05/17  0315 09/04/17  0251 09/03/17  2248   SODIUM mmol/L 136  --  137 137   POTASSIUM mmol/L 3.3*  --  3.6 3.6   CHLORIDE mmol/L 99  --  102 97*   CO2 mmol/L 23.0  --  22.8 25.0   BUN mg/dL 10  --  26* 29*   CREATININE mg/dL 0.97 1.08 1.66* 2.09*   CALCIUM mg/dL 8.2*  --  7.6* 9.2   GLUCOSE mg/dL 127*  --  94 93       Microbiology Results (last 10 days)     Procedure Component Value - Date/Time    Blood Culture [[id number]]  (Normal) Collected:  09/04/17 0251    Lab Status:  Preliminary result Specimen:  Blood from Hand, Right Updated:  09/06/17 0303     Blood Culture No growth at 2 days    Blood Culture [[id number]]  (Normal) Collected:  09/03/17 2248    Lab Status:  Preliminary result Specimen:  Blood from Arm, Right Updated:  09/05/17 2301     Blood Culture No growth at 2 days        MRI reviewed  Assessment:  Active Problems:  Cellulitis of the left arm with possible olecranon bursitis and abscess.  Doubt septic arthritis.    MOUNIKA (acute kidney injury)    SVT  (supraventricular tachycardia)      Plan:  continue present treatment. When ready to dc may switch to po doxy and keflex - If tolerates when ready to dc. Would recommend 10- 14 days of treatment. Continue elevation of arm  Discussed with Dr. Dax Oliveros MD  9/6/2017  8:16 AM    Much of this encounter note is an electronic transcription/translation of spoken language to printed text. The electronic translation of spoken language may permit erroneous, or at times, nonsensical words or phrases to be inadvertently transcribed; Although I have reviewed the note for such errors, some may still exist

## 2017-09-07 NOTE — PAYOR COMM NOTE
"Darion Henryheidy SHELTON Jr. (37 y.o. Male)     Date of Birth Social Security Number Address Home Phone MRN    1980  [address] [facility] [address] [phone] [id number]    [employer] Mar[employer] Status          None        Admission Date Admission Type Admitting Provider Attending Provider Department, Room/Bed    9/3/17 Emergency Navjot Guzmán MD  [address], P780/1    Discharge Date Discharge Disposition Discharge Destination        9/6/2017 Home or Self Care             Attending Provider: (none)    Allergies:  No Known Allergies    Isolation:  None   Infection:  None   Code Status:  Prior    Ht:  72\" (182.9 cm)   Wt:  165 lb 3.2 oz (74.9 kg)    Admission Cmt:  None   Principal Problem:  Cellulitis of left upper extremity [L03.114]                 Active Insurance as of 9/3/2017     Primary Coverage     Payor Plan Insurance Group Employer/Plan Group    [facility] [employer] [id number]     Payor Plan Address Payor Plan Phone Number Effective From Effective To    [address] [phone] 1/1/2015     [place] [address]       Subscriber Name Subscriber Birth Date Member ID       BHUMI HENRY JR. 1980 [id number]                 Emergency Contacts      (Rel.) Home Phone Work Phone Mobile Phone    Jenifer Henry (Spouse) [phone] -- --               Discharge Summary      Nate Verduzco MD at 9/6/2017 12:50 PM            Date of Admission: 9/3/2017  Date of Discharge:  9/6/2017  Primary Care Physician: Milton Vigil MD     Discharge Diagnosis:  Principal Problem:    Cellulitis of left upper extremity  Active Problems:    MOUNIKA (acute kidney injury)    SVT (supraventricular tachycardia)      DETAILS OF HOSPITAL STAY     Pertinent Test Results and Procedures Performed    MRI of the left elbow:  Cellulitis at the left elbow. There is no evidence of  abscess, osteomyelitis, or septic " arthritis.    X-ray of the left elbow showed no acute osseous abnormality    Blood cultures are negative at 2 days    Hospital Course    This is a 37-year-old white male who presented to the emergency room with implants of swelling and warmth around the left elbow.  Please see H&P for full details of admission.  He was admitted and started on antibiotics in the form of vancomycin and Zosyn.  He was evaluated by infectious disease and orthopedic surgery.  He had an MRI of the left elbow as described above.  Ultimately they showed cellulitis and soft tissue involvement but no evidence of abscess, osteomyelitis, or septic arthritis.  The patient's elbow pain, swelling, and warmth along with range of motion have started to improve with initiation of IV antibiotics and elevation of the arm.  Orthopedic surgery and infectious disease recommended continuation of antibiotics and I have spoken with Dr. Oliveros today who states the patients can be transitioned to oral antibiotics in the form of doxycycline and Keflex.  He will receive 12 total days of antibiotics for skin and soft tissue infection.  I have discussed plans for discharge with the patient today and is in agreement with this plan.  He is medically stable for discharge.    Physical Exam at Discharge:  General: No acute distress, AAOx3  HEENT: EOMI, PERRL  Cardiovascular: +s1 and s2, RRR  Lungs: No rhonchi or wheezing  Abdomen: soft, nontender    Consults:   Consults     Date and Time Order Name Status Description    9/4/2017 0131 Inpatient Consult to Infectious Diseases Completed     9/4/2017 0112 Inpatient Consult to Orthopedic Surgery Completed     9/3/2017 2357 [facility] (on-call MD unless specified) Completed             Condition on Discharge: Stable    Discharge Disposition  Home or Self Care    Discharge Medications   Bob Henry Jr.   Home Medication Instructions HANDY:[id number]    Printed on:09/06/17 1250   Medication Information                       ALPRAZolam (XANAX) 0.5 MG tablet               buPROPion SR (WELLBUTRIN SR) 150 MG 12 hr tablet  Take 150 mg by mouth.             cephalexin (KEFLEX) 500 MG capsule  Take 1 capsule by mouth 3 (Three) Times a Day for 10 days.             diltiaZEM CD (CARDIZEM CD) 120 MG 24 hr capsule  Take 120 mg by mouth.             doxycycline (VIBRAMYCIN) 50 MG capsule  Take 2 capsules by mouth 2 (Two) Times a Day for 10 days.             flecainide (TAMBOCOR) 100 MG tablet  Take 100 mg by mouth.             gabapentin (NEURONTIN) 300 MG capsule  Take 300 mg by mouth.             HYDROcodone-acetaminophen (NORCO) 5-325 MG per tablet  Take 1 tablet by mouth Every 6 (Six) Hours As Needed for Severe Pain  for up to 2 days.             ibuprofen (ADVIL,MOTRIN) 800 MG tablet                   Discharge Diet:   Diet Instructions     Diet: Regular; Thin       Discharge Diet:  Regular   Fluid Consistency:  Thin                 Activity at Discharge:   Activity Instructions     Activity as Tolerated                     Follow-up Appointments  No future appointments.  Additional Instructions for the Follow-ups that You Need to Schedule     Discharge Follow-up with PCP    As directed    Follow Up Details:  10 days                 Test Results Pending at Discharge   Order Current Status    Blood Culture Preliminary result    Blood Culture Preliminary result          I have examined and discussed discharge planning with the patient today.     Nate Verduzco MD  09/06/17  12:50 PM    Time: Discharge less than 30 min             Electronically signed by Nate Verduzco MD at 9/6/2017 12:54 PM

## 2017-09-08 LAB — BACTERIA SPEC AEROBE CULT: NORMAL

## 2017-09-09 LAB — BACTERIA SPEC AEROBE CULT: NORMAL

## 2019-11-11 ENCOUNTER — HOSPITAL ENCOUNTER (OUTPATIENT)
Dept: GENERAL RADIOLOGY | Facility: HOSPITAL | Age: 39
Discharge: HOME | End: 2019-11-11
Admitting: UROLOGY

## 2019-11-11 ENCOUNTER — TRANSCRIBE ORDERS (OUTPATIENT)
Dept: ADMINISTRATIVE | Facility: HOSPITAL | Age: 39
End: 2019-11-11

## 2019-11-11 DIAGNOSIS — N20.0 CALCULUS, RENAL: ICD-10-CM

## 2019-11-11 DIAGNOSIS — N20.0 CALCULUS, RENAL: Primary | ICD-10-CM

## 2019-11-11 PROCEDURE — 74018 RADEX ABDOMEN 1 VIEW: CPT

## 2019-11-19 ENCOUNTER — HOSPITAL ENCOUNTER (EMERGENCY)
Facility: HOSPITAL | Age: 39
Discharge: HOME | End: 2019-11-19
Attending: EMERGENCY MEDICINE | Admitting: EMERGENCY MEDICINE

## 2019-11-19 ENCOUNTER — APPOINTMENT (OUTPATIENT)
Dept: CT IMAGING | Facility: HOSPITAL | Age: 39
End: 2019-11-19

## 2019-11-19 VITALS
RESPIRATION RATE: 18 BRPM | HEART RATE: 67 BPM | WEIGHT: 150 LBS | BODY MASS INDEX: 20.32 KG/M2 | TEMPERATURE: 98 F | SYSTOLIC BLOOD PRESSURE: 158 MMHG | OXYGEN SATURATION: 99 % | DIASTOLIC BLOOD PRESSURE: 109 MMHG | HEIGHT: 72 IN

## 2019-11-19 DIAGNOSIS — N20.1 CALCULUS OF URETER: Primary | ICD-10-CM

## 2019-11-19 LAB
ALBUMIN SERPL-MCNC: 4.7 G/DL (ref 3.5–5.2)
ALBUMIN/GLOB SERPL: 1.7 G/DL
ALP SERPL-CCNC: 73 U/L (ref 39–117)
ALT SERPL W P-5'-P-CCNC: 16 U/L (ref 1–41)
ANION GAP SERPL CALCULATED.3IONS-SCNC: 10.6 MMOL/L (ref 5–15)
AST SERPL-CCNC: 18 U/L (ref 1–40)
BASOPHILS # BLD AUTO: 0.09 10*3/MM3 (ref 0–0.2)
BASOPHILS NFR BLD AUTO: 1.5 % (ref 0–1.5)
BILIRUB SERPL-MCNC: 0.5 MG/DL (ref 0.2–1.2)
BILIRUB UR QL STRIP: NEGATIVE
BUN BLD-MCNC: 13 MG/DL (ref 6–20)
BUN/CREAT SERPL: 11.5 (ref 7–25)
CALCIUM SPEC-SCNC: 9 MG/DL (ref 8.6–10.5)
CHLORIDE SERPL-SCNC: 104 MMOL/L (ref 98–107)
CLARITY UR: CLEAR
CO2 SERPL-SCNC: 27.4 MMOL/L (ref 22–29)
COLOR UR: YELLOW
CREAT BLD-MCNC: 1.13 MG/DL (ref 0.76–1.27)
DEPRECATED RDW RBC AUTO: 42.7 FL (ref 37–54)
EOSINOPHIL # BLD AUTO: 0.06 10*3/MM3 (ref 0–0.4)
EOSINOPHIL NFR BLD AUTO: 1 % (ref 0.3–6.2)
ERYTHROCYTE [DISTWIDTH] IN BLOOD BY AUTOMATED COUNT: 13 % (ref 12.3–15.4)
GFR SERPL CREATININE-BSD FRML MDRD: 72 ML/MIN/1.73
GLOBULIN UR ELPH-MCNC: 2.7 GM/DL
GLUCOSE BLD-MCNC: 95 MG/DL (ref 65–99)
GLUCOSE UR STRIP-MCNC: NEGATIVE MG/DL
HCT VFR BLD AUTO: 43.8 % (ref 37.5–51)
HGB BLD-MCNC: 14.8 G/DL (ref 13–17.7)
HGB UR QL STRIP.AUTO: NEGATIVE
HOLD SPECIMEN: NORMAL
HOLD SPECIMEN: NORMAL
IMM GRANULOCYTES # BLD AUTO: 0.01 10*3/MM3 (ref 0–0.05)
IMM GRANULOCYTES NFR BLD AUTO: 0.2 % (ref 0–0.5)
KETONES UR QL STRIP: NEGATIVE
LEUKOCYTE ESTERASE UR QL STRIP.AUTO: NEGATIVE
LIPASE SERPL-CCNC: 20 U/L (ref 13–60)
LYMPHOCYTES # BLD AUTO: 1.62 10*3/MM3 (ref 0.7–3.1)
LYMPHOCYTES NFR BLD AUTO: 26.3 % (ref 19.6–45.3)
MCH RBC QN AUTO: 30.3 PG (ref 26.6–33)
MCHC RBC AUTO-ENTMCNC: 33.8 G/DL (ref 31.5–35.7)
MCV RBC AUTO: 89.6 FL (ref 79–97)
MONOCYTES # BLD AUTO: 0.52 10*3/MM3 (ref 0.1–0.9)
MONOCYTES NFR BLD AUTO: 8.4 % (ref 5–12)
NEUTROPHILS # BLD AUTO: 3.87 10*3/MM3 (ref 1.7–7)
NEUTROPHILS NFR BLD AUTO: 62.6 % (ref 42.7–76)
NITRITE UR QL STRIP: NEGATIVE
NRBC BLD AUTO-RTO: 0 /100 WBC (ref 0–0.2)
PH UR STRIP.AUTO: 8 [PH] (ref 5–8)
PLATELET # BLD AUTO: 270 10*3/MM3 (ref 140–450)
PMV BLD AUTO: 10.3 FL (ref 6–12)
POTASSIUM BLD-SCNC: 4.1 MMOL/L (ref 3.5–5.2)
PROT SERPL-MCNC: 7.4 G/DL (ref 6–8.5)
PROT UR QL STRIP: NEGATIVE
RBC # BLD AUTO: 4.89 10*6/MM3 (ref 4.14–5.8)
SODIUM BLD-SCNC: 142 MMOL/L (ref 136–145)
SP GR UR STRIP: 1.01 (ref 1–1.03)
UROBILINOGEN UR QL STRIP: NORMAL
WBC NRBC COR # BLD: 6.17 10*3/MM3 (ref 3.4–10.8)
WHOLE BLOOD HOLD SPECIMEN: NORMAL
WHOLE BLOOD HOLD SPECIMEN: NORMAL

## 2019-11-19 PROCEDURE — 74176 CT ABD & PELVIS W/O CONTRAST: CPT

## 2019-11-19 PROCEDURE — 80053 COMPREHEN METABOLIC PANEL: CPT

## 2019-11-19 PROCEDURE — 83690 ASSAY OF LIPASE: CPT

## 2019-11-19 PROCEDURE — 81003 URINALYSIS AUTO W/O SCOPE: CPT

## 2019-11-19 PROCEDURE — 85025 COMPLETE CBC W/AUTO DIFF WBC: CPT

## 2019-11-19 PROCEDURE — 36415 COLL VENOUS BLD VENIPUNCTURE: CPT

## 2019-11-19 PROCEDURE — 96374 THER/PROPH/DIAG INJ IV PUSH: CPT

## 2019-11-19 PROCEDURE — 99283 EMERGENCY DEPT VISIT LOW MDM: CPT

## 2019-11-19 PROCEDURE — 25010000002 KETOROLAC TROMETHAMINE PER 15 MG: Performed by: EMERGENCY MEDICINE

## 2019-11-19 RX ORDER — OXYCODONE HYDROCHLORIDE AND ACETAMINOPHEN 5; 325 MG/1; MG/1
1 TABLET ORAL EVERY 6 HOURS PRN
Qty: 12 TABLET | Refills: 0 | Status: SHIPPED | OUTPATIENT
Start: 2019-11-19 | End: 2020-05-13

## 2019-11-19 RX ORDER — SODIUM CHLORIDE 0.9 % (FLUSH) 0.9 %
10 SYRINGE (ML) INJECTION AS NEEDED
Status: DISCONTINUED | OUTPATIENT
Start: 2019-11-19 | End: 2019-11-19 | Stop reason: HOSPADM

## 2019-11-19 RX ORDER — KETOROLAC TROMETHAMINE 15 MG/ML
15 INJECTION, SOLUTION INTRAMUSCULAR; INTRAVENOUS ONCE
Status: COMPLETED | OUTPATIENT
Start: 2019-11-19 | End: 2019-11-19

## 2019-11-19 RX ORDER — TAMSULOSIN HYDROCHLORIDE 0.4 MG/1
1 CAPSULE ORAL DAILY
Qty: 30 CAPSULE | Refills: 0 | Status: SHIPPED | OUTPATIENT
Start: 2019-11-19 | End: 2020-05-13

## 2019-11-19 RX ORDER — OXYCODONE HYDROCHLORIDE AND ACETAMINOPHEN 5; 325 MG/1; MG/1
1 TABLET ORAL ONCE
Status: COMPLETED | OUTPATIENT
Start: 2019-11-19 | End: 2019-11-19

## 2019-11-19 RX ADMIN — OXYCODONE AND ACETAMINOPHEN 1 TABLET: 5; 325 TABLET ORAL at 16:52

## 2019-11-19 RX ADMIN — KETOROLAC TROMETHAMINE 15 MG: 15 INJECTION, SOLUTION INTRAMUSCULAR; INTRAVENOUS at 16:14

## 2019-11-19 RX ADMIN — LIDOCAINE HYDROCHLORIDE 100 MG: 10 INJECTION, SOLUTION INFILTRATION; PERINEURAL at 16:37

## 2019-11-19 NOTE — ED NOTES
"Pt states \"I was diagnosed with a kidney stone about 4 days ago but I think it's starting to cause an infection. I've been shivering, it's been hard to pee and I'm having a lot of increased pain in my left side.\"     Sammie Broussard, RN  11/19/19 4646    "

## 2019-11-19 NOTE — ED PROVIDER NOTES
EMERGENCY DEPARTMENT ENCOUNTER    Room Number:  21/21  Date of encounter:  11/28/2019  PCP: Milton Vigil MD  Historian: patient      HPI:  Chief Complaint: left flank pain  A complete HPI/ROS/PMH/PSH/SH/FH are unobtainable due to: none    Context: Bob Henry Jr. is a 39 y.o. male who presents to the ED c/o intermittent worsening left flank pain radiating into LLQ. Pt also complains of chills, dysuria, difficulty urinating, light headedness and nausea but denies vomiting and fever. Pt reports recently being dx with kidney stones on left side. Pt states he went to Dr. Leos, urology. Pt is not anticoagulated.       PAST MEDICAL HISTORY  Active Ambulatory Problems     Diagnosis Date Noted   • MOUNIKA (acute kidney injury) (CMS/[facility]) 09/04/2017   • SVT (supraventricular tachycardia) (CMS/[facility]) 09/04/2017   • Cellulitis of left upper extremity 09/06/2017   • Left ureteral calculus 11/22/2019     Resolved Ambulatory Problems     Diagnosis Date Noted   • Pyogenic arthritis of left elbow (CMS/[facility]) 09/04/2017     Past Medical History:   Diagnosis Date   • History of stomach ulcers    • Hypertension    • Irregular heart beat    • Kidney stone    • Restless leg syndrome    • Septic bursitis of elbow 2018         PAST SURGICAL HISTORY  Past Surgical History:   Procedure Laterality Date   • EXTRACORPOREAL SHOCK WAVE LITHOTRIPSY (ESWL) Left 11/22/2019    Procedure: LEFT CYSTOSCOPY WITH STONE BASKET EXTRACTION;  Surgeon: Antonio Arauz MD;  Location: [facility];  Service: Urology   • KIDNEY STONE SURGERY     • SHOULDER SURGERY     • WISDOM TOOTH EXTRACTION           FAMILY HISTORY  Family History   Problem Relation Age of Onset   • Irregular heart beat Father    • No Known Problems Mother    • Malig Hyperthermia Neg Hx          SOCIAL HISTORY  Social History     Socioeconomic History   • Marital status:      Spouse name: Not on file   • Number of children: Not on file   • Years of education: Not on file   •  Highest education level: Not on file   Tobacco Use   • Smoking status: Never Smoker   • Smokeless tobacco: Never Used   Substance and Sexual Activity   • Alcohol use: No   • Drug use: No   • Sexual activity: Defer         ALLERGIES  Patient has no known allergies.        REVIEW OF SYSTEMS  Review of Systems   Constitutional: Positive for chills. Negative for activity change, appetite change and fever.   HENT: Negative for congestion and sore throat.    Eyes: Negative.    Respiratory: Negative for cough and shortness of breath.    Cardiovascular: Negative for chest pain and leg swelling.   Gastrointestinal: Positive for abdominal pain (left flank) and nausea. Negative for diarrhea and vomiting.   Endocrine: Negative.    Genitourinary: Positive for difficulty urinating and dysuria. Negative for decreased urine volume.   Musculoskeletal: Negative for neck pain.   Skin: Negative for rash and wound.   Allergic/Immunologic: Negative.    Neurological: Positive for light-headedness. Negative for weakness, numbness and headaches.   Hematological: Negative.    Psychiatric/Behavioral: Negative.    All other systems reviewed and are negative.     All systems reviewed and negative except for those discussed in HPI.       PHYSICAL EXAM    I have reviewed the triage vital signs and nursing notes.    ED Triage Vitals   Temp Heart Rate Resp BP SpO2   11/19/19 1255 11/19/19 1255 11/19/19 1255 11/19/19 1300 11/19/19 1255   98 °F (36.7 °C) 81 18 (!) 177/118 99 %      Temp src Heart Rate Source Patient Position BP Location FiO2 (%)   11/19/19 1255 11/19/19 1452 11/19/19 1452 11/19/19 1452 --   Tympanic Monitor Sitting Left arm        GENERAL: not distressed  HENT: nares patent  EYES: no scleral icterus  CV: regular rhythm, regular rate  RESPIRATORY: normal effort  ABDOMEN: soft  MUSCULOSKELETAL: no deformity  NEURO: alert, moves all extremities, follows commands  SKIN: warm, dry    Physical Exam    LAB RESULTS  No results found for this  or any previous visit (from the past 24 hour(s)).    Ordered the above labs and independently reviewed the results.        RADIOLOGY  No Radiology Exams Resulted Within Past 24 Hours    I ordered the above noted radiological studies. Reviewed by me and discussed with radiologist.  See dictation for official radiology interpretation.      PROCEDURES    Procedures      MEDICATIONS GIVEN IN ER    Medications   ketorolac (TORADOL) injection 15 mg (15 mg Intravenous Given 11/19/19 1614)   lidocaine (XYLOCAINE) 1 % 100 mg in sodium chloride 0.9 % 250 mL IVPB (100 mg Intravenous Given 11/19/19 1637)   oxyCODONE-acetaminophen (PERCOCET) 5-325 MG per tablet 1 tablet (1 tablet Oral Given 11/19/19 1652)         PROGRESS, DATA ANALYSIS, CONSULTS, AND MEDICAL DECISION MAKING    All labs have been independently reviewed by me.  All radiology studies have been reviewed by me and discussed with radiologist dictating the report.   EKG's independently viewed and interpreted by me.  Discussion below represents my analysis of pertinent findings related to patient's condition, differential diagnosis, treatment plan and final disposition.      ED Course as of Nov 28 1346   Tue Nov 19, 2019   1814 Was given IV Toradol as well as IV lidocaine infusion for pain.  Also given a p.o. Percocet  [DP]   1814 Labs and urinalysis unremarkable  [DP]   1814 CT scan shows a 5 to 6 mm calculus at the left UVJ with no hydronephrosis  [DP]   1814 Patient to follow-up with Dr. Leos as soon as possible  [DP]      ED Course User Index  [DP] Oleg Kruse MD        1520 Discussed pt case with Dr Kruse. After seeing the pt, Dr Kruse agrees with plan of care and recommends CT abd/pel.    1700 D/c per Dr Kruse     AS OF 1:46 PM VITALS:    BP - (!) 158/109  HR - 67  TEMP - 98 °F (36.7 °C) (Tympanic)  02 SATS - 99%        DIAGNOSIS  Final diagnoses:   Calculus of ureter         DISPOSITION  DISCHARGE    Patient discharged in stable condition.    Reviewed  implications of results, diagnosis, meds, responsibility to follow up, warning signs and symptoms of possible worsening, potential complications and reasons to return to ER, including new or worsening sx.    Patient/Family voiced understanding of above instructions.    Discussed plan for discharge, as there is no emergent indication for admission. Patient referred to primary care provider for BP management due to today's BP. Pt/family is agreeable and understands need for follow up and repeat testing.  Pt is aware that discharge does not mean that nothing is wrong but it indicates no emergency is present that requires admission and they must continue care with follow-up as given below or physician of their choice.     FOLLOW-UP  Andres Leos MD  [address] [address]  [phone]    Call            Medication List      New Prescriptions    oxyCODONE-acetaminophen 5-325 MG per tablet  Commonly known as:  PERCOCET  Take 1 tablet by mouth Every 6 (Six) Hours As Needed for Severe Pain .     tamsulosin 0.4 MG capsule 24 hr capsule  Commonly known as:  FLOMAX  Take 1 capsule by mouth Daily.                  Documentation assistance provided by anuj Mejia for ALLIE Whyte.  Information recorded by the anuj was done at my direction and has been verified and validated by me.              Maame Mejia  11/19/19 1707       Grecia Leos APRN  11/19/19 1711       Grecia Leos APRN  11/28/19 2718

## 2019-11-19 NOTE — ED PROVIDER NOTES
EMERGENCY DEPARTMENT ENCOUNTER    Room Number:  21/21  Date of encounter:  11/19/2019  PCP: Milton Vigil MD  Historian: Patient      HPI:  Chief Complaint: Left flank pain  A complete HPI/ROS/PMH/PSH/SH/FH are unobtainable due to: Nothing    Context: Bob Henry Jr. is a 39 y.o. male who presents to the ED c/o severe left flank pain.  The pain begins in the left CVA area and radiates to the left groin.  He also feels some urinary frequency and urgency.  There is no fever, nausea or vomiting.  There are no exacerbating or alleviating factors.    Patient has a long history of kidney stones and sees Dr. Andres Leos.  He says that he saw Dr. Leos in the office last week and had a KUB which showed a 5 to 6 mm stone in the ureter.  He also reports that Dr. Leos offered to remove the stone, but he declined at that time hoping to pass it which she is done on many occasions.    Since then, he said that the pain comes and goes, but today is been rather severe.  I am not able to visualize the reported x-ray that was done in the office.  I did attempt to look forward on [facility].      PAST MEDICAL HISTORY  Active Ambulatory Problems     Diagnosis Date Noted   • MOUNIKA (acute kidney injury) (CMS/[facility]) 09/04/2017   • SVT (supraventricular tachycardia) (CMS/[facility]) 09/04/2017   • Cellulitis of left upper extremity 09/06/2017     Resolved Ambulatory Problems     Diagnosis Date Noted   • Pyogenic arthritis of left elbow (CMS/[facility]) 09/04/2017     Past Medical History:   Diagnosis Date   • Irregular heart beat    • Kidney stone    • Restless leg syndrome          PAST SURGICAL HISTORY  Past Surgical History:   Procedure Laterality Date   • KIDNEY STONE SURGERY     • SHOULDER SURGERY           FAMILY HISTORY  Family History   Problem Relation Age of Onset   • Irregular heart beat Father    • No Known Problems Mother          SOCIAL HISTORY  Social History     Socioeconomic History   • Marital status:      Spouse name: Not  on file   • Number of children: Not on file   • Years of education: Not on file   • Highest education level: Not on file   Tobacco Use   • Smoking status: Never Smoker   • Smokeless tobacco: Never Used   Substance and Sexual Activity   • Alcohol use: No   • Drug use: No         ALLERGIES  Patient has no known allergies.        REVIEW OF SYSTEMS  Review of Systems     All systems reviewed and negative except for those discussed in HPI.       PHYSICAL EXAM    I have reviewed the triage vital signs and nursing notes.    ED Triage Vitals   Temp Heart Rate Resp BP SpO2   11/19/19 1255 11/19/19 1255 11/19/19 1255 11/19/19 1300 11/19/19 1255   98 °F (36.7 °C) 81 18 (!) 177/118 99 %      Temp src Heart Rate Source Patient Position BP Location FiO2 (%)   11/19/19 1255 11/19/19 1452 11/19/19 1452 11/19/19 1452 --   Tympanic Monitor Sitting Left arm        Physical Exam  GENERAL: Mild distress  HENT: nares patent  EYES: no scleral icterus  CV: regular rhythm, regular rate  RESPIRATORY: normal effort  ABDOMEN: soft, mild left CVA tenderness  MUSCULOSKELETAL: no deformity  NEURO: alert, moves all extremities, follows commands  SKIN: warm, dry        LAB RESULTS  Recent Results (from the past 24 hour(s))   Comprehensive Metabolic Panel    Collection Time: 11/19/19  1:19 PM   Result Value Ref Range    Glucose 95 65 - 99 mg/dL    BUN 13 6 - 20 mg/dL    Creatinine 1.13 0.76 - 1.27 mg/dL    Sodium 142 136 - 145 mmol/L    Potassium 4.1 3.5 - 5.2 mmol/L    Chloride 104 98 - 107 mmol/L    CO2 27.4 22.0 - 29.0 mmol/L    Calcium 9.0 8.6 - 10.5 mg/dL    Total Protein 7.4 6.0 - 8.5 g/dL    Albumin 4.70 3.50 - 5.20 g/dL    ALT (SGPT) 16 1 - 41 U/L    AST (SGOT) 18 1 - 40 U/L    Alkaline Phosphatase 73 39 - 117 U/L    Total Bilirubin 0.5 0.2 - 1.2 mg/dL    eGFR Non African Amer 72 >60 mL/min/1.73    Globulin 2.7 gm/dL    A/G Ratio 1.7 g/dL    BUN/Creatinine Ratio 11.5 7.0 - 25.0    Anion Gap 10.6 5.0 - 15.0 mmol/L   Lipase    Collection Time:  11/19/19  1:19 PM   Result Value Ref Range    Lipase 20 13 - 60 U/L   Urinalysis With Microscopic If Indicated (No Culture) - Urine, Clean Catch    Collection Time: 11/19/19  1:19 PM   Result Value Ref Range    Color, UA Yellow Yellow, Straw    Appearance, UA Clear Clear    pH, UA 8.0 5.0 - 8.0    Specific Gravity, UA 1.015 1.005 - 1.030    Glucose, UA Negative Negative    Ketones, UA Negative Negative    Bilirubin, UA Negative Negative    Blood, UA Negative Negative    Protein, UA Negative Negative    Leuk Esterase, UA Negative Negative    Nitrite, UA Negative Negative    Urobilinogen, UA 0.2 E.U./dL 0.2 - 1.0 E.U./dL   Light Blue Top    Collection Time: 11/19/19  1:19 PM   Result Value Ref Range    Extra Tube hold for add-on    Green Top (Gel)    Collection Time: 11/19/19  1:19 PM   Result Value Ref Range    Extra Tube Hold for add-ons.    Lavender Top    Collection Time: 11/19/19  1:19 PM   Result Value Ref Range    Extra Tube hold for add-on    Gold Top - SST    Collection Time: 11/19/19  1:19 PM   Result Value Ref Range    Extra Tube Hold for add-ons.    CBC Auto Differential    Collection Time: 11/19/19  1:19 PM   Result Value Ref Range    WBC 6.17 3.40 - 10.80 10*3/mm3    RBC 4.89 4.14 - 5.80 10*6/mm3    Hemoglobin 14.8 13.0 - 17.7 g/dL    Hematocrit 43.8 37.5 - 51.0 %    MCV 89.6 79.0 - 97.0 fL    MCH 30.3 26.6 - 33.0 pg    MCHC 33.8 31.5 - 35.7 g/dL    RDW 13.0 12.3 - 15.4 %    RDW-SD 42.7 37.0 - 54.0 fl    MPV 10.3 6.0 - 12.0 fL    Platelets 270 140 - 450 10*3/mm3    Neutrophil % 62.6 42.7 - 76.0 %    Lymphocyte % 26.3 19.6 - 45.3 %    Monocyte % 8.4 5.0 - 12.0 %    Eosinophil % 1.0 0.3 - 6.2 %    Basophil % 1.5 0.0 - 1.5 %    Immature Grans % 0.2 0.0 - 0.5 %    Neutrophils, Absolute 3.87 1.70 - 7.00 10*3/mm3    Lymphocytes, Absolute 1.62 0.70 - 3.10 10*3/mm3    Monocytes, Absolute 0.52 0.10 - 0.90 10*3/mm3    Eosinophils, Absolute 0.06 0.00 - 0.40 10*3/mm3    Basophils, Absolute 0.09 0.00 - 0.20 10*3/mm3     Immature Grans, Absolute 0.01 0.00 - 0.05 10*3/mm3    nRBC 0.0 0.0 - 0.2 /100 WBC       Ordered the above labs and independently reviewed the results.        RADIOLOGY  Ct Abdomen Pelvis Without Contrast    Result Date: 11/19/2019  CT ABDOMEN AND PELVIS WITHOUT IV CONTRAST  HISTORY: Left flank pain.  TECHNIQUE: Radiation dose reduction techniques were utilized, including automated exposure control and exposure modulation based on body size. 3 mm images were obtained through the abdomen and pelvis without IV contrast.  COMPARISON: None  FINDINGS: Small hiatal hernia is present.  There are no findings of small bowel obstruction.  The appendix is unremarkable.  The liver, gallbladder, pancreas, spleen and adrenal glands have an unremarkable noncontrast CT appearance. Bilateral nephrolithiasis is present, measuring up to 0.3-0.4 cm on the right and punctate on the left. There is a 0.6 cm calculus within the distal left ureter just proximal to the left ureterovesical junction. There is no upstream hydroureteronephrosis. There is no abdominopelvic adenopathy by size criteria.  Colon is unremarkable.  There is no free intraperitoneal air or fluid.  No suspicious lytic or blastic osseous lesions are present. Bilateral pars defects are present at L5-S1 with mild anterolisthesis at this level.  The bladder is unremarkable for its degree of distention.      1.  0.6 cm calculus is present within the distal left ureter just proximal to the left ureterovesical junction. There is no upstream hydroureteronephrosis. This was discussed with Dr. Kruse by telephone at 4:15 PM on 11/19/2019. 2.  Bilateral nephrolithiasis as above. 3.  Bilateral pars defects at L5-S1 with resultant mild anterolisthesis at this level. 4.  Small hiatal hernia. 5.  Other findings as above.          I ordered the above noted radiological studies. Reviewed by me and discussed with radiologist.  See dictation for official radiology  interpretation.      PROCEDURES    Procedures      MEDICATIONS GIVEN IN ER    Medications   sodium chloride 0.9 % flush 10 mL (not administered)   ketorolac (TORADOL) injection 15 mg (15 mg Intravenous Given 11/19/19 1614)   lidocaine (XYLOCAINE) 1 % 100 mg in sodium chloride 0.9 % 250 mL IVPB (100 mg Intravenous Given 11/19/19 1637)   oxyCODONE-acetaminophen (PERCOCET) 5-325 MG per tablet 1 tablet (1 tablet Oral Given 11/19/19 1652)         PROGRESS, DATA ANALYSIS, CONSULTS, AND MEDICAL DECISION MAKING    All labs have been independently reviewed by me.  All radiology studies have been reviewed by me and discussed with radiologist dictating the report.   EKG's independently viewed and interpreted by me.  Discussion below represents my analysis of pertinent findings related to patient's condition, differential diagnosis, treatment plan and final disposition.        ED Course as of Nov 19 1814   Tue Nov 19, 2019   1814 Was given IV Toradol as well as IV lidocaine infusion for pain.  Also given a p.o. Percocet  [DP]   1814 Labs and urinalysis unremarkable  [DP]   1814 CT scan shows a 5 to 6 mm calculus at the left UVJ with no hydronephrosis  [DP]   1814 Patient to follow-up with Dr. Leos as soon as possible  [DP]      ED Course User Index  [DP] Oleg Kruse MD           AS OF 6:14 PM VITALS:    BP - (!) 158/109  HR - 67  TEMP - 98 °F (36.7 °C) (Tympanic)  02 SATS - 99%        DIAGNOSIS  Final diagnoses:   Calculus of ureter         DISPOSITION  Discharge           Oleg Kruse MD  11/19/19 1814

## 2019-11-21 RX ORDER — IRBESARTAN 150 MG/1
150 TABLET ORAL DAILY
Refills: 1 | COMMUNITY
Start: 2019-10-04 | End: 2020-05-13

## 2019-11-22 ENCOUNTER — ANESTHESIA EVENT (OUTPATIENT)
Dept: PERIOP | Facility: HOSPITAL | Age: 39
End: 2019-11-22

## 2019-11-22 ENCOUNTER — HOSPITAL ENCOUNTER (OUTPATIENT)
Facility: HOSPITAL | Age: 39
Setting detail: HOSPITAL OUTPATIENT SURGERY
Discharge: HOME | End: 2019-11-22
Attending: UROLOGY | Admitting: UROLOGY

## 2019-11-22 ENCOUNTER — ANESTHESIA (OUTPATIENT)
Dept: PERIOP | Facility: HOSPITAL | Age: 39
End: 2019-11-22

## 2019-11-22 VITALS
DIASTOLIC BLOOD PRESSURE: 93 MMHG | TEMPERATURE: 98 F | BODY MASS INDEX: 21.11 KG/M2 | SYSTOLIC BLOOD PRESSURE: 131 MMHG | RESPIRATION RATE: 16 BRPM | HEART RATE: 60 BPM | WEIGHT: 155.65 LBS | OXYGEN SATURATION: 96 %

## 2019-11-22 DIAGNOSIS — N20.1 LEFT URETERAL STONE: ICD-10-CM

## 2019-11-22 PROCEDURE — 25010000002 ONDANSETRON PER 1 MG: Performed by: NURSE ANESTHETIST, CERTIFIED REGISTERED

## 2019-11-22 PROCEDURE — C1769 GUIDE WIRE: HCPCS | Performed by: UROLOGY

## 2019-11-22 PROCEDURE — 25010000002 PROPOFOL 10 MG/ML EMULSION: Performed by: NURSE ANESTHETIST, CERTIFIED REGISTERED

## 2019-11-22 PROCEDURE — 82360 CALCULUS ASSAY QUANT: CPT | Performed by: UROLOGY

## 2019-11-22 PROCEDURE — 25010000002 FENTANYL CITRATE (PF) 100 MCG/2ML SOLUTION: Performed by: NURSE ANESTHETIST, CERTIFIED REGISTERED

## 2019-11-22 PROCEDURE — 25010000002 DEXAMETHASONE PER 1 MG: Performed by: NURSE ANESTHETIST, CERTIFIED REGISTERED

## 2019-11-22 PROCEDURE — 25010000003 CEFAZOLIN IN DEXTROSE 2-4 GM/100ML-% SOLUTION: Performed by: UROLOGY

## 2019-11-22 RX ORDER — ONDANSETRON 2 MG/ML
INJECTION INTRAMUSCULAR; INTRAVENOUS AS NEEDED
Status: DISCONTINUED | OUTPATIENT
Start: 2019-11-22 | End: 2019-11-22 | Stop reason: SURG

## 2019-11-22 RX ORDER — HYDROCODONE BITARTRATE AND ACETAMINOPHEN 7.5; 325 MG/1; MG/1
1 TABLET ORAL ONCE AS NEEDED
Status: DISCONTINUED | OUTPATIENT
Start: 2019-11-22 | End: 2019-11-22 | Stop reason: HOSPADM

## 2019-11-22 RX ORDER — MIDAZOLAM HYDROCHLORIDE 1 MG/ML
2 INJECTION INTRAMUSCULAR; INTRAVENOUS
Status: DISCONTINUED | OUTPATIENT
Start: 2019-11-22 | End: 2019-11-22 | Stop reason: HOSPADM

## 2019-11-22 RX ORDER — HYDRALAZINE HYDROCHLORIDE 20 MG/ML
5 INJECTION INTRAMUSCULAR; INTRAVENOUS
Status: CANCELLED | OUTPATIENT
Start: 2019-11-22

## 2019-11-22 RX ORDER — HYDRALAZINE HYDROCHLORIDE 20 MG/ML
5 INJECTION INTRAMUSCULAR; INTRAVENOUS
Status: DISCONTINUED | OUTPATIENT
Start: 2019-11-22 | End: 2019-11-22 | Stop reason: HOSPADM

## 2019-11-22 RX ORDER — PROMETHAZINE HYDROCHLORIDE 25 MG/1
25 TABLET ORAL ONCE AS NEEDED
Status: CANCELLED | OUTPATIENT
Start: 2019-11-22

## 2019-11-22 RX ORDER — DIPHENHYDRAMINE HYDROCHLORIDE 50 MG/ML
12.5 INJECTION INTRAMUSCULAR; INTRAVENOUS
Status: DISCONTINUED | OUTPATIENT
Start: 2019-11-22 | End: 2019-11-22 | Stop reason: HOSPADM

## 2019-11-22 RX ORDER — HYDROCODONE BITARTRATE AND ACETAMINOPHEN 5; 325 MG/1; MG/1
1 TABLET ORAL ONCE AS NEEDED
Status: CANCELLED | OUTPATIENT
Start: 2019-11-22

## 2019-11-22 RX ORDER — PROMETHAZINE HYDROCHLORIDE 25 MG/1
25 TABLET ORAL ONCE AS NEEDED
Status: DISCONTINUED | OUTPATIENT
Start: 2019-11-22 | End: 2019-11-22 | Stop reason: HOSPADM

## 2019-11-22 RX ORDER — DIPHENHYDRAMINE HYDROCHLORIDE 50 MG/ML
12.5 INJECTION INTRAMUSCULAR; INTRAVENOUS
Status: CANCELLED | OUTPATIENT
Start: 2019-11-22

## 2019-11-22 RX ORDER — ACETAMINOPHEN 325 MG/1
650 TABLET ORAL ONCE AS NEEDED
Status: CANCELLED | OUTPATIENT
Start: 2019-11-22

## 2019-11-22 RX ORDER — DEXAMETHASONE SODIUM PHOSPHATE 10 MG/ML
INJECTION INTRAMUSCULAR; INTRAVENOUS AS NEEDED
Status: DISCONTINUED | OUTPATIENT
Start: 2019-11-22 | End: 2019-11-22 | Stop reason: SURG

## 2019-11-22 RX ORDER — SULFAMETHOXAZOLE AND TRIMETHOPRIM 800; 160 MG/1; MG/1
1 TABLET ORAL 2 TIMES DAILY
Qty: 6 TABLET | Refills: 0 | Status: SHIPPED | OUTPATIENT
Start: 2019-11-22 | End: 2020-05-13

## 2019-11-22 RX ORDER — LIDOCAINE HYDROCHLORIDE 20 MG/ML
INJECTION, SOLUTION INFILTRATION; PERINEURAL AS NEEDED
Status: DISCONTINUED | OUTPATIENT
Start: 2019-11-22 | End: 2019-11-22 | Stop reason: SURG

## 2019-11-22 RX ORDER — NALOXONE HCL 0.4 MG/ML
0.4 VIAL (ML) INJECTION AS NEEDED
Status: CANCELLED | OUTPATIENT
Start: 2019-11-22

## 2019-11-22 RX ORDER — ACETAMINOPHEN 500 MG
500 TABLET ORAL ONCE
Status: COMPLETED | OUTPATIENT
Start: 2019-11-22 | End: 2019-11-22

## 2019-11-22 RX ORDER — NALBUPHINE HCL 10 MG/ML
10 AMPUL (ML) INJECTION EVERY 4 HOURS PRN
Status: CANCELLED | OUTPATIENT
Start: 2019-11-22

## 2019-11-22 RX ORDER — FENTANYL CITRATE 50 UG/ML
50 INJECTION, SOLUTION INTRAMUSCULAR; INTRAVENOUS
Status: DISCONTINUED | OUTPATIENT
Start: 2019-11-22 | End: 2019-11-22 | Stop reason: HOSPADM

## 2019-11-22 RX ORDER — PROMETHAZINE HYDROCHLORIDE 25 MG/ML
6.25 INJECTION, SOLUTION INTRAMUSCULAR; INTRAVENOUS ONCE AS NEEDED
Status: CANCELLED | OUTPATIENT
Start: 2019-11-22

## 2019-11-22 RX ORDER — LEVOFLOXACIN 5 MG/ML
500 INJECTION, SOLUTION INTRAVENOUS ONCE
Status: DISCONTINUED | OUTPATIENT
Start: 2019-11-22 | End: 2019-11-22 | Stop reason: HOSPADM

## 2019-11-22 RX ORDER — MIDAZOLAM HYDROCHLORIDE 1 MG/ML
1 INJECTION INTRAMUSCULAR; INTRAVENOUS
Status: DISCONTINUED | OUTPATIENT
Start: 2019-11-22 | End: 2019-11-22 | Stop reason: HOSPADM

## 2019-11-22 RX ORDER — ACETAMINOPHEN 325 MG/1
650 TABLET ORAL ONCE AS NEEDED
Status: DISCONTINUED | OUTPATIENT
Start: 2019-11-22 | End: 2019-11-22 | Stop reason: HOSPADM

## 2019-11-22 RX ORDER — NALBUPHINE HCL 10 MG/ML
2 AMPUL (ML) INJECTION EVERY 4 HOURS PRN
Status: CANCELLED | OUTPATIENT
Start: 2019-11-22

## 2019-11-22 RX ORDER — ONDANSETRON 2 MG/ML
4 INJECTION INTRAMUSCULAR; INTRAVENOUS ONCE AS NEEDED
Status: DISCONTINUED | OUTPATIENT
Start: 2019-11-22 | End: 2019-11-22 | Stop reason: HOSPADM

## 2019-11-22 RX ORDER — EPHEDRINE SULFATE 50 MG/ML
5 INJECTION, SOLUTION INTRAVENOUS ONCE AS NEEDED
Status: DISCONTINUED | OUTPATIENT
Start: 2019-11-22 | End: 2019-11-22 | Stop reason: HOSPADM

## 2019-11-22 RX ORDER — PROMETHAZINE HYDROCHLORIDE 25 MG/1
25 SUPPOSITORY RECTAL ONCE AS NEEDED
Status: DISCONTINUED | OUTPATIENT
Start: 2019-11-22 | End: 2019-11-22 | Stop reason: HOSPADM

## 2019-11-22 RX ORDER — PROMETHAZINE HYDROCHLORIDE 25 MG/ML
12.5 INJECTION, SOLUTION INTRAMUSCULAR; INTRAVENOUS ONCE AS NEEDED
Status: DISCONTINUED | OUTPATIENT
Start: 2019-11-22 | End: 2019-11-22 | Stop reason: HOSPADM

## 2019-11-22 RX ORDER — SODIUM CHLORIDE 0.9 % (FLUSH) 0.9 %
3-10 SYRINGE (ML) INJECTION AS NEEDED
Status: DISCONTINUED | OUTPATIENT
Start: 2019-11-22 | End: 2019-11-22 | Stop reason: HOSPADM

## 2019-11-22 RX ORDER — CEFAZOLIN SODIUM 2 G/100ML
2 INJECTION, SOLUTION INTRAVENOUS ONCE
Status: COMPLETED | OUTPATIENT
Start: 2019-11-22 | End: 2019-11-22

## 2019-11-22 RX ORDER — HYDROMORPHONE HYDROCHLORIDE 1 MG/ML
0.5 INJECTION, SOLUTION INTRAMUSCULAR; INTRAVENOUS; SUBCUTANEOUS
Status: DISCONTINUED | OUTPATIENT
Start: 2019-11-22 | End: 2019-11-22 | Stop reason: HOSPADM

## 2019-11-22 RX ORDER — ACETAMINOPHEN 650 MG/1
650 SUPPOSITORY RECTAL ONCE AS NEEDED
Status: CANCELLED | OUTPATIENT
Start: 2019-11-22

## 2019-11-22 RX ORDER — PROPOFOL 10 MG/ML
VIAL (ML) INTRAVENOUS AS NEEDED
Status: DISCONTINUED | OUTPATIENT
Start: 2019-11-22 | End: 2019-11-22 | Stop reason: SURG

## 2019-11-22 RX ORDER — PROMETHAZINE HYDROCHLORIDE 25 MG/ML
6.25 INJECTION, SOLUTION INTRAMUSCULAR; INTRAVENOUS
Status: DISCONTINUED | OUTPATIENT
Start: 2019-11-22 | End: 2019-11-22 | Stop reason: HOSPADM

## 2019-11-22 RX ORDER — DIPHENHYDRAMINE HCL 25 MG
25 CAPSULE ORAL
Status: DISCONTINUED | OUTPATIENT
Start: 2019-11-22 | End: 2019-11-22 | Stop reason: HOSPADM

## 2019-11-22 RX ORDER — SODIUM CHLORIDE, SODIUM LACTATE, POTASSIUM CHLORIDE, CALCIUM CHLORIDE 600; 310; 30; 20 MG/100ML; MG/100ML; MG/100ML; MG/100ML
9 INJECTION, SOLUTION INTRAVENOUS CONTINUOUS
Status: DISCONTINUED | OUTPATIENT
Start: 2019-11-22 | End: 2019-11-22 | Stop reason: HOSPADM

## 2019-11-22 RX ORDER — SODIUM CHLORIDE 0.9 % (FLUSH) 0.9 %
3 SYRINGE (ML) INJECTION EVERY 12 HOURS SCHEDULED
Status: DISCONTINUED | OUTPATIENT
Start: 2019-11-22 | End: 2019-11-22 | Stop reason: HOSPADM

## 2019-11-22 RX ORDER — LABETALOL HYDROCHLORIDE 5 MG/ML
5 INJECTION, SOLUTION INTRAVENOUS
Status: DISCONTINUED | OUTPATIENT
Start: 2019-11-22 | End: 2019-11-22 | Stop reason: HOSPADM

## 2019-11-22 RX ORDER — PROMETHAZINE HYDROCHLORIDE 25 MG/1
25 SUPPOSITORY RECTAL ONCE AS NEEDED
Status: CANCELLED | OUTPATIENT
Start: 2019-11-22

## 2019-11-22 RX ORDER — OXYCODONE AND ACETAMINOPHEN 7.5; 325 MG/1; MG/1
1 TABLET ORAL ONCE AS NEEDED
Status: COMPLETED | OUTPATIENT
Start: 2019-11-22 | End: 2019-11-22

## 2019-11-22 RX ORDER — LIDOCAINE HYDROCHLORIDE 10 MG/ML
0.5 INJECTION, SOLUTION EPIDURAL; INFILTRATION; INTRACAUDAL; PERINEURAL ONCE AS NEEDED
Status: DISCONTINUED | OUTPATIENT
Start: 2019-11-22 | End: 2019-11-22 | Stop reason: HOSPADM

## 2019-11-22 RX ORDER — NALOXONE HCL 0.4 MG/ML
0.2 VIAL (ML) INJECTION AS NEEDED
Status: DISCONTINUED | OUTPATIENT
Start: 2019-11-22 | End: 2019-11-22 | Stop reason: HOSPADM

## 2019-11-22 RX ORDER — ACETAMINOPHEN 650 MG/1
650 SUPPOSITORY RECTAL ONCE AS NEEDED
Status: DISCONTINUED | OUTPATIENT
Start: 2019-11-22 | End: 2019-11-22 | Stop reason: HOSPADM

## 2019-11-22 RX ORDER — FENTANYL CITRATE 50 UG/ML
50 INJECTION, SOLUTION INTRAMUSCULAR; INTRAVENOUS
Status: CANCELLED | OUTPATIENT
Start: 2019-11-22

## 2019-11-22 RX ORDER — FLUMAZENIL 0.1 MG/ML
0.2 INJECTION INTRAVENOUS AS NEEDED
Status: DISCONTINUED | OUTPATIENT
Start: 2019-11-22 | End: 2019-11-22 | Stop reason: HOSPADM

## 2019-11-22 RX ORDER — HYDROCODONE BITARTRATE AND ACETAMINOPHEN 7.5; 325 MG/1; MG/1
1-2 TABLET ORAL EVERY 4 HOURS PRN
Qty: 20 TABLET | Refills: 0 | Status: SHIPPED | OUTPATIENT
Start: 2019-11-22 | End: 2020-05-13

## 2019-11-22 RX ORDER — FENTANYL CITRATE 50 UG/ML
INJECTION, SOLUTION INTRAMUSCULAR; INTRAVENOUS AS NEEDED
Status: DISCONTINUED | OUTPATIENT
Start: 2019-11-22 | End: 2019-11-22 | Stop reason: SURG

## 2019-11-22 RX ADMIN — PROPOFOL 200 MG: 10 INJECTION, EMULSION INTRAVENOUS at 12:43

## 2019-11-22 RX ADMIN — ONDANSETRON HYDROCHLORIDE 4 MG: 2 SOLUTION INTRAMUSCULAR; INTRAVENOUS at 12:55

## 2019-11-22 RX ADMIN — SODIUM CHLORIDE, POTASSIUM CHLORIDE, SODIUM LACTATE AND CALCIUM CHLORIDE 9 ML/HR: 600; 310; 30; 20 INJECTION, SOLUTION INTRAVENOUS at 12:17

## 2019-11-22 RX ADMIN — FENTANYL CITRATE 50 MCG: 50 INJECTION INTRAMUSCULAR; INTRAVENOUS at 12:43

## 2019-11-22 RX ADMIN — OXYCODONE HYDROCHLORIDE AND ACETAMINOPHEN 1 TABLET: 7.5; 325 TABLET ORAL at 13:42

## 2019-11-22 RX ADMIN — CEFAZOLIN SODIUM 2 G: 2 INJECTION, SOLUTION INTRAVENOUS at 12:42

## 2019-11-22 RX ADMIN — FENTANYL CITRATE 50 MCG: 50 INJECTION, SOLUTION INTRAMUSCULAR; INTRAVENOUS at 13:32

## 2019-11-22 RX ADMIN — LIDOCAINE HYDROCHLORIDE 100 MG: 20 INJECTION, SOLUTION INFILTRATION; PERINEURAL at 12:43

## 2019-11-22 RX ADMIN — FENTANYL CITRATE 50 MCG: 50 INJECTION, SOLUTION INTRAMUSCULAR; INTRAVENOUS at 13:43

## 2019-11-22 RX ADMIN — ACETAMINOPHEN 500 MG: 500 TABLET, FILM COATED ORAL at 12:17

## 2019-11-22 RX ADMIN — DEXAMETHASONE SODIUM PHOSPHATE 4 MG: 10 INJECTION INTRAMUSCULAR; INTRAVENOUS at 12:55

## 2019-11-22 NOTE — H&P
[facility] CONSULT      Patient Identification:  NAME:  Bob Henry Jr.  Age:  39 y.o.   Sex:  male   :  1980   MRN:  [id number]       Chief complaint: Left ureteral calculus  History of present illness: 6 mm distal left ureteral calculus    Past medical history:  Past Medical History:   Diagnosis Date   • History of stomach ulcers    • Hypertension    • Irregular heart beat    • Kidney stone    • Restless leg syndrome    • Septic bursitis of elbow 2018       Past surgical history:  Past Surgical History:   Procedure Laterality Date   • KIDNEY STONE SURGERY     • SHOULDER SURGERY     • WISDOM TOOTH EXTRACTION         Allergies:  Patient has no known allergies.    Home medications:  Medications Prior to Admission   Medication Sig Dispense Refill Last Dose   • buPROPion SR (WELLBUTRIN SR) 150 MG 12 hr tablet Take 150 mg by mouth Daily.   2017 at Unknown time   • diltiaZEM CD (CARDIZEM CD) 120 MG 24 hr capsule Take 120 mg by mouth.   2017 at Unknown time   • flecainide (TAMBOCOR) 100 MG tablet Take 100 mg by mouth.   2017 at Unknown time   • gabapentin (NEURONTIN) 300 MG capsule Take 300 mg by mouth.   2017 at Unknown time   • ibuprofen (ADVIL,MOTRIN) 800 MG tablet Take 800 mg by mouth. HASN'T TAKEN IN OVER TWO WEEKS   2017 at Unknown time   • oxyCODONE-acetaminophen (PERCOCET) 5-325 MG per tablet Take 1 tablet by mouth Every 6 (Six) Hours As Needed for Severe Pain . 12 tablet 0    • tamsulosin (FLOMAX) 0.4 MG capsule 24 hr capsule Take 1 capsule by mouth Daily. 30 capsule 0    • irbesartan (AVAPRO) 150 MG tablet Take 150 mg by mouth Daily.  1         Hospital medications:    ceFAZolin 2 g Intravenous Once            Family history:  Family History   Problem Relation Age of Onset   • Irregular heart beat Father    • No Known Problems Mother    • Malig Hyperthermia Neg Hx        Social history:  Social History     Tobacco Use   • Smoking status: Never Smoker   • Smokeless  tobacco: Never Used   Substance Use Topics   • Alcohol use: No   • Drug use: No       Review of systems:      Positive for: Recurrent kidney stones    Objective:  TMax 24 hours:   Temp (24hrs), Av °F (36.7 °C), Min:98 °F (36.7 °C), Max:98 °F (36.7 °C)      Vitals Ranges:   Temp:  [98 °F (36.7 °C)] 98 °F (36.7 °C)  Heart Rate:  [72] 72  Resp:  [16] 16  BP: (155)/(108) 155/108    Intake/Output Last 3 shifts:  No intake/output data recorded.     Physical Exam:    General Appearance:    Alert, cooperative, NAD   HEENT:    No trauma, pupils reactive, hearing intact   Back:     No CVA tenderness   Lungs:     Respirations unlabored, no wheezing    Heart:    RRR, intact peripheral pulses   Abdomen:     Soft, NDNT, no masses, no guarding   :    Testes descended bilaterally, no nodules.  Penis normal.  No scrotal or penile rashes noted   Extremities:   No edema, no deformity   Lymphatic:   No neck or groin LAD   Skin:   No bleeding, bruising or rashes   Neuro/Psych:   Orientation intact, mood/affect pleasant, no focal findings       Results review:   I reviewed the patient's new clinical results.    Data review:  Lab Results (last 24 hours)     ** No results found for the last 24 hours. **           Imaging:  Imaging Results (Last 24 Hours)     ** No results found for the last 24 hours. **             Assessment:       * No active hospital problems. *    Left ureteral calculus  Plan:     Cystoscopy double-J stent    Left ESWL    Antonio Arauz MD  19  11:49 AM

## 2019-11-22 NOTE — ANESTHESIA PROCEDURE NOTES
Airway  Urgency: elective    Date/Time: 11/22/2019 12:46 PM  Airway not difficult    General Information and Staff    Patient location during procedure: OR  Anesthesiologist: Alice, Alexandrekylee Kuo MD  CRNA: Cherry Davies CRNA    Indications and Patient Condition  Indications for airway management: airway protection    Preoxygenated: yes  MILS not maintained throughout  Mask difficulty assessment: 1 - vent by mask    Final Airway Details  Final airway type: supraglottic airway      Successful airway: classic  Size 5    Number of attempts at approach: 1  Assessment: lips, teeth, and gum same as pre-op    Additional Comments  Lma insertion appears atraumatic. Dentition intact.

## 2019-11-22 NOTE — OP NOTE
Operative Report     [facility]    Patient: Bob Henry Jr.  Age:      39 y.o.  :     1980  Sex:      male    Medical Record:  [id number]    Date of Operation/Procedure:  2019    Pre-op Diagnosis:   Ureteral calculus    Post-Op Diagnosis Codes:  Same    Pre-operative Diagnosis Free Text:  * No pre-op diagnosis entered *     Name of Operation/Procedure:  Procedure(s) and Anesthesia Type:     * EXTRACORPOREAL SHOCKWAVE LITHOTRIPSY LEFT - General    Findings/Complications: Stone left distal ureter    Description of procedure: Successful induction of general anesthesia patient was placed in the litho-suite.  Under fluoroscopic control we image the stone in the distal ureter.  Because it was relatively small we elected to extract it with the stone basket.  A flexible cystoscopic examination was carried out and a 3 [place] stone basket was passed into the distal ureter and the stone was easily extracted.  It was sent for analysis.  He will follow-up in 2 weeks with Dr. Oleg Leos    Estimated Blood Loss: 0 mL    Specimens: * No orders in the log *    Fluids/Drains: None    Antonio Arauz MD  2019  12:58 PM

## 2019-11-22 NOTE — ANESTHESIA PREPROCEDURE EVALUATION
Anesthesia Evaluation     no history of anesthetic complications:  NPO Solid Status: > 8 hours             Airway   Mallampati: II  TM distance: >3 FB  Neck ROM: full  No difficulty expected  Dental - normal exam     Pulmonary - negative pulmonary ROS and normal exam   Cardiovascular     Rhythm: regular    (+) hypertension,       Neuro/Psych- negative ROS  GI/Hepatic/Renal/Endo    (+)   renal disease,     Musculoskeletal (-) negative ROS    Abdominal  - normal exam   Substance History      OB/GYN          Other                        Anesthesia Plan    ASA 2     general   (  D/W R&B of GA including but not limited to: heart, lung, liver, kidney, neurologic problems, positioning injuries, dental damage, corneal abrasion and TMJ.  .)  intravenous induction

## 2019-11-22 NOTE — ANESTHESIA POSTPROCEDURE EVALUATION
Patient: Bob Henry Jr.    Procedure Summary     Date:  11/22/19 Room / Location:   [facility]  /  [facility]    Anesthesia Start:  1242 Anesthesia Stop:  1307    Procedure:  LEFT CYSTOSCOPY WITH STONE BASKET EXTRACTION (Left ) Diagnosis:      Surgeon:  Antonio Arauz MD Provider:  Alice, Alexandre Kuo MD    Anesthesia Type:  general ASA Status:  2          Anesthesia Type: general  Last vitals  BP   131/93 (11/22/19 1400)   Temp   36.7 °C (98 °F) (11/22/19 1400)   Pulse   60 (11/22/19 1400)   Resp   16 (11/22/19 1400)     SpO2   96 % (11/22/19 1400)     Post Anesthesia Care and Evaluation    Patient location during evaluation: bedside  Patient participation: complete - patient participated  Level of consciousness: awake and alert  Pain management: adequate  Airway patency: patent  Anesthetic complications: No anesthetic complications    Cardiovascular status: acceptable  Respiratory status: acceptable  Hydration status: acceptable    Comments: /93   Pulse 60   Temp 36.7 °C (98 °F) (Temporal)   Resp 16   Wt 70.6 kg (155 lb 10.3 oz)   SpO2 96%   BMI 21.11 kg/m²        Problem: Risk for Maternal Complications  Goal: Remains free of signs and symptoms of infection  Outcome: Outcome Met, Continue evaluating goal progress toward completion  Intervention: Assist and promote personal & perineal hygiene     07/24/19 0100   Comfort and Hygiene   Hygiene Self;Perineal care   Personal Care Pad changed       Goal: Remains free from falls  Outcome: Outcome Met, Continue evaluating goal progress toward completion  Intervention: Ambulate with assistance PRN     07/24/19 0100   Safety Measures   Environmental Safety Measures Maintained Done   Patient Specific Safety Measures in Use Safe lighting as appropriate   Risk for Fall-Related Injury Interventions   Interventions to Prevent Special Condition Falling Not needed at this time   Activity and Safety   Mobility Assistive Device None   Mobility   Level of Assistance Independent     Intervention: Provide and maintain a safe environment     07/24/19 0100   Safety Measures   Environmental Safety Measures Maintained Done   Patient Specific Safety Measures in Use Safe lighting as appropriate         Problem: Pain (Non-Labor and/or Postpartum)  Goal: Acceptable pain/ comfort level is achieved/maintained at rest and with activity without oversedation (based on self-reporting using NRS / Faces)  Outcome: Outcome Met, Continue evaluating goal progress toward completion     07/24/19 0130   Pain Evaluation at Rest   Patient's Comfort/Function (Pain) GOAL At Rest 2   Comfort/Function (Pain) SCORE at Rest 2   Pain Evaluation at Rest Pain level acceptable - continue plan of care     Intervention: Implement pain/comforting interventions     07/24/19 0048 07/24/19 0100 07/24/19 0130   Pain/ Comfort Interventions   Pain Interventions Medication (see MAR)  --   --    Comforting Interventions Adult  --   --  Distraction;Family's presence;Quiet environment   --POSTPARTUM--   Perineum  --  Laceration  --

## 2019-12-04 LAB
CA PHOS CRY STONE QL IR: 20 %
COD CRY STONE QL IR: 25 %
COLOR STONE: NORMAL
COM CRY STONE QL IR: 55 %
COMPN STONE: NORMAL
Lab: NORMAL
Lab: NORMAL
NIDUS STONE QL: NORMAL
PATH REPORT.COMMENTS IMP SPEC: NORMAL
SIZE STONE: NORMAL MM
SURFACE CRYSTALS: NORMAL
WT STONE: 39 MG

## 2021-04-06 ENCOUNTER — BULK ORDERING (OUTPATIENT)
Dept: CASE MANAGEMENT | Facility: OTHER | Age: 41
End: 2021-04-06

## 2021-04-06 DIAGNOSIS — Z23 IMMUNIZATION DUE: ICD-10-CM

## 2021-08-20 ENCOUNTER — ANESTHESIA (OUTPATIENT)
Dept: PERIOP | Facility: HOSPITAL | Age: 41
End: 2021-08-20

## 2021-08-20 ENCOUNTER — ANESTHESIA EVENT (OUTPATIENT)
Dept: PERIOP | Facility: HOSPITAL | Age: 41
End: 2021-08-20

## 2021-08-20 ENCOUNTER — HOSPITAL ENCOUNTER (OUTPATIENT)
Facility: HOSPITAL | Age: 41
Setting detail: HOSPITAL OUTPATIENT SURGERY
Discharge: HOME | End: 2021-08-20
Attending: UROLOGY | Admitting: UROLOGY

## 2021-08-20 ENCOUNTER — APPOINTMENT (OUTPATIENT)
Dept: GENERAL RADIOLOGY | Facility: HOSPITAL | Age: 41
End: 2021-08-20

## 2021-08-20 VITALS
OXYGEN SATURATION: 97 % | TEMPERATURE: 97.9 F | SYSTOLIC BLOOD PRESSURE: 162 MMHG | BODY MASS INDEX: 20.36 KG/M2 | RESPIRATION RATE: 16 BRPM | HEIGHT: 72 IN | DIASTOLIC BLOOD PRESSURE: 103 MMHG | HEART RATE: 74 BPM | WEIGHT: 150.35 LBS

## 2021-08-20 DIAGNOSIS — N20.0 RENAL CALCULUS: ICD-10-CM

## 2021-08-20 DIAGNOSIS — N20.1 URETERAL CALCULUS: Primary | ICD-10-CM

## 2021-08-20 DIAGNOSIS — L03.114 CELLULITIS OF LEFT UPPER EXTREMITY: ICD-10-CM

## 2021-08-20 PROBLEM — F32.A DEPRESSIVE DISORDER: Status: ACTIVE | Noted: 2021-08-20

## 2021-08-20 PROBLEM — I10 HYPERTENSION: Status: ACTIVE | Noted: 2018-08-22

## 2021-08-20 PROBLEM — G25.81 RESTLESS LEGS SYNDROME: Status: ACTIVE | Noted: 2021-08-20

## 2021-08-20 LAB — SARS-COV-2 RNA PNL SPEC NAA+PROBE: NOT DETECTED

## 2021-08-20 PROCEDURE — 87635 SARS-COV-2 COVID-19 AMP PRB: CPT | Performed by: UROLOGY

## 2021-08-20 PROCEDURE — 25010000002 DEXAMETHASONE PER 1 MG: Performed by: NURSE ANESTHETIST, CERTIFIED REGISTERED

## 2021-08-20 PROCEDURE — 25010000002 MIDAZOLAM PER 1 MG: Performed by: ANESTHESIOLOGY

## 2021-08-20 PROCEDURE — 25010000002 ONDANSETRON PER 1 MG: Performed by: NURSE ANESTHETIST, CERTIFIED REGISTERED

## 2021-08-20 PROCEDURE — 25010000002 KETOROLAC TROMETHAMINE PER 15 MG: Performed by: NURSE ANESTHETIST, CERTIFIED REGISTERED

## 2021-08-20 PROCEDURE — C1758 CATHETER, URETERAL: HCPCS | Performed by: UROLOGY

## 2021-08-20 PROCEDURE — 25010000002 PROPOFOL 10 MG/ML EMULSION: Performed by: NURSE ANESTHETIST, CERTIFIED REGISTERED

## 2021-08-20 PROCEDURE — 25010000002 FENTANYL CITRATE (PF) 50 MCG/ML SOLUTION: Performed by: NURSE ANESTHETIST, CERTIFIED REGISTERED

## 2021-08-20 PROCEDURE — 82365 CALCULUS SPECTROSCOPY: CPT | Performed by: UROLOGY

## 2021-08-20 PROCEDURE — C9803 HOPD COVID-19 SPEC COLLECT: HCPCS

## 2021-08-20 PROCEDURE — 25010000003 CEFAZOLIN IN DEXTROSE 2-4 GM/100ML-% SOLUTION: Performed by: UROLOGY

## 2021-08-20 PROCEDURE — C2617 STENT, NON-COR, TEM W/O DEL: HCPCS | Performed by: UROLOGY

## 2021-08-20 PROCEDURE — 74420 UROGRAPHY RTRGR +-KUB: CPT

## 2021-08-20 DEVICE — URETERAL STENT
Type: IMPLANTABLE DEVICE | Site: URETER | Status: FUNCTIONAL
Brand: CONTOUR™

## 2021-08-20 RX ORDER — IBUPROFEN 200 MG
400 TABLET ORAL EVERY 6 HOURS PRN
COMMUNITY

## 2021-08-20 RX ORDER — OXYCODONE AND ACETAMINOPHEN 10; 325 MG/1; MG/1
1 TABLET ORAL EVERY 4 HOURS PRN
Status: DISCONTINUED | OUTPATIENT
Start: 2021-08-20 | End: 2021-08-20 | Stop reason: HOSPADM

## 2021-08-20 RX ORDER — LABETALOL HYDROCHLORIDE 5 MG/ML
5 INJECTION, SOLUTION INTRAVENOUS
Status: DISCONTINUED | OUTPATIENT
Start: 2021-08-20 | End: 2021-08-20 | Stop reason: HOSPADM

## 2021-08-20 RX ORDER — FENTANYL CITRATE 50 UG/ML
50 INJECTION, SOLUTION INTRAMUSCULAR; INTRAVENOUS
Status: DISCONTINUED | OUTPATIENT
Start: 2021-08-20 | End: 2021-08-20 | Stop reason: HOSPADM

## 2021-08-20 RX ORDER — DIPHENHYDRAMINE HYDROCHLORIDE 50 MG/ML
12.5 INJECTION INTRAMUSCULAR; INTRAVENOUS
Status: DISCONTINUED | OUTPATIENT
Start: 2021-08-20 | End: 2021-08-20 | Stop reason: HOSPADM

## 2021-08-20 RX ORDER — PROMETHAZINE HYDROCHLORIDE 25 MG/1
25 TABLET ORAL ONCE AS NEEDED
Status: DISCONTINUED | OUTPATIENT
Start: 2021-08-20 | End: 2021-08-20 | Stop reason: HOSPADM

## 2021-08-20 RX ORDER — KETOROLAC TROMETHAMINE 30 MG/ML
INJECTION, SOLUTION INTRAMUSCULAR; INTRAVENOUS AS NEEDED
Status: DISCONTINUED | OUTPATIENT
Start: 2021-08-20 | End: 2021-08-20 | Stop reason: SURG

## 2021-08-20 RX ORDER — LIDOCAINE HYDROCHLORIDE 10 MG/ML
0.5 INJECTION, SOLUTION EPIDURAL; INFILTRATION; INTRACAUDAL; PERINEURAL ONCE AS NEEDED
Status: DISCONTINUED | OUTPATIENT
Start: 2021-08-20 | End: 2021-08-20 | Stop reason: HOSPADM

## 2021-08-20 RX ORDER — MAGNESIUM HYDROXIDE 1200 MG/15ML
LIQUID ORAL AS NEEDED
Status: DISCONTINUED | OUTPATIENT
Start: 2021-08-20 | End: 2021-08-20 | Stop reason: HOSPADM

## 2021-08-20 RX ORDER — HYDROMORPHONE HYDROCHLORIDE 1 MG/ML
0.5 INJECTION, SOLUTION INTRAMUSCULAR; INTRAVENOUS; SUBCUTANEOUS
Status: DISCONTINUED | OUTPATIENT
Start: 2021-08-20 | End: 2021-08-20 | Stop reason: HOSPADM

## 2021-08-20 RX ORDER — SODIUM CHLORIDE 0.9 % (FLUSH) 0.9 %
3-10 SYRINGE (ML) INJECTION AS NEEDED
Status: DISCONTINUED | OUTPATIENT
Start: 2021-08-20 | End: 2021-08-20 | Stop reason: HOSPADM

## 2021-08-20 RX ORDER — NALOXONE HCL 0.4 MG/ML
0.2 VIAL (ML) INJECTION AS NEEDED
Status: DISCONTINUED | OUTPATIENT
Start: 2021-08-20 | End: 2021-08-20 | Stop reason: HOSPADM

## 2021-08-20 RX ORDER — LIDOCAINE HYDROCHLORIDE 20 MG/ML
INJECTION, SOLUTION INFILTRATION; PERINEURAL AS NEEDED
Status: DISCONTINUED | OUTPATIENT
Start: 2021-08-20 | End: 2021-08-20 | Stop reason: SURG

## 2021-08-20 RX ORDER — IBUPROFEN 600 MG/1
600 TABLET ORAL ONCE AS NEEDED
Status: DISCONTINUED | OUTPATIENT
Start: 2021-08-20 | End: 2021-08-20 | Stop reason: HOSPADM

## 2021-08-20 RX ORDER — HYDRALAZINE HYDROCHLORIDE 20 MG/ML
5 INJECTION INTRAMUSCULAR; INTRAVENOUS
Status: DISCONTINUED | OUTPATIENT
Start: 2021-08-20 | End: 2021-08-20 | Stop reason: HOSPADM

## 2021-08-20 RX ORDER — SODIUM CHLORIDE, SODIUM LACTATE, POTASSIUM CHLORIDE, CALCIUM CHLORIDE 600; 310; 30; 20 MG/100ML; MG/100ML; MG/100ML; MG/100ML
9 INJECTION, SOLUTION INTRAVENOUS CONTINUOUS
Status: DISCONTINUED | OUTPATIENT
Start: 2021-08-20 | End: 2021-08-20 | Stop reason: HOSPADM

## 2021-08-20 RX ORDER — PROPOFOL 10 MG/ML
VIAL (ML) INTRAVENOUS AS NEEDED
Status: DISCONTINUED | OUTPATIENT
Start: 2021-08-20 | End: 2021-08-20 | Stop reason: SURG

## 2021-08-20 RX ORDER — FAMOTIDINE 10 MG/ML
20 INJECTION, SOLUTION INTRAVENOUS ONCE
Status: COMPLETED | OUTPATIENT
Start: 2021-08-20 | End: 2021-08-20

## 2021-08-20 RX ORDER — ONDANSETRON 2 MG/ML
4 INJECTION INTRAMUSCULAR; INTRAVENOUS ONCE AS NEEDED
Status: DISCONTINUED | OUTPATIENT
Start: 2021-08-20 | End: 2021-08-20 | Stop reason: HOSPADM

## 2021-08-20 RX ORDER — EPHEDRINE SULFATE 50 MG/ML
5 INJECTION, SOLUTION INTRAVENOUS ONCE AS NEEDED
Status: DISCONTINUED | OUTPATIENT
Start: 2021-08-20 | End: 2021-08-20 | Stop reason: HOSPADM

## 2021-08-20 RX ORDER — OXYCODONE AND ACETAMINOPHEN 10; 325 MG/1; MG/1
1 TABLET ORAL EVERY 6 HOURS PRN
Qty: 30 TABLET | Refills: 0 | Status: SHIPPED | OUTPATIENT
Start: 2021-08-20 | End: 2022-08-04

## 2021-08-20 RX ORDER — MIDAZOLAM HYDROCHLORIDE 1 MG/ML
1 INJECTION INTRAMUSCULAR; INTRAVENOUS
Status: DISCONTINUED | OUTPATIENT
Start: 2021-08-20 | End: 2021-08-20 | Stop reason: HOSPADM

## 2021-08-20 RX ORDER — OXYCODONE AND ACETAMINOPHEN 10; 325 MG/1; MG/1
1 TABLET ORAL EVERY 6 HOURS PRN
Status: ON HOLD | COMMUNITY
End: 2021-08-20 | Stop reason: SDUPTHER

## 2021-08-20 RX ORDER — ONDANSETRON 2 MG/ML
INJECTION INTRAMUSCULAR; INTRAVENOUS AS NEEDED
Status: DISCONTINUED | OUTPATIENT
Start: 2021-08-20 | End: 2021-08-20 | Stop reason: SURG

## 2021-08-20 RX ORDER — CEFAZOLIN SODIUM 2 G/100ML
2 INJECTION, SOLUTION INTRAVENOUS ONCE
Status: COMPLETED | OUTPATIENT
Start: 2021-08-20 | End: 2021-08-20

## 2021-08-20 RX ORDER — PROMETHAZINE HYDROCHLORIDE 25 MG/1
25 SUPPOSITORY RECTAL ONCE AS NEEDED
Status: DISCONTINUED | OUTPATIENT
Start: 2021-08-20 | End: 2021-08-20 | Stop reason: HOSPADM

## 2021-08-20 RX ORDER — HYDROCODONE BITARTRATE AND ACETAMINOPHEN 7.5; 325 MG/1; MG/1
1 TABLET ORAL ONCE AS NEEDED
Status: DISCONTINUED | OUTPATIENT
Start: 2021-08-20 | End: 2021-08-20 | Stop reason: HOSPADM

## 2021-08-20 RX ORDER — FLUMAZENIL 0.1 MG/ML
0.2 INJECTION INTRAVENOUS AS NEEDED
Status: DISCONTINUED | OUTPATIENT
Start: 2021-08-20 | End: 2021-08-20 | Stop reason: HOSPADM

## 2021-08-20 RX ORDER — DIPHENHYDRAMINE HCL 25 MG
25 CAPSULE ORAL
Status: DISCONTINUED | OUTPATIENT
Start: 2021-08-20 | End: 2021-08-20 | Stop reason: HOSPADM

## 2021-08-20 RX ORDER — SODIUM CHLORIDE 0.9 % (FLUSH) 0.9 %
3 SYRINGE (ML) INJECTION EVERY 12 HOURS SCHEDULED
Status: DISCONTINUED | OUTPATIENT
Start: 2021-08-20 | End: 2021-08-20 | Stop reason: HOSPADM

## 2021-08-20 RX ORDER — DEXAMETHASONE SODIUM PHOSPHATE 4 MG/ML
INJECTION, SOLUTION INTRA-ARTICULAR; INTRALESIONAL; INTRAMUSCULAR; INTRAVENOUS; SOFT TISSUE AS NEEDED
Status: DISCONTINUED | OUTPATIENT
Start: 2021-08-20 | End: 2021-08-20 | Stop reason: SURG

## 2021-08-20 RX ADMIN — FENTANYL CITRATE 50 MCG: 50 INJECTION, SOLUTION INTRAMUSCULAR; INTRAVENOUS at 19:40

## 2021-08-20 RX ADMIN — ONDANSETRON 4 MG: 2 INJECTION INTRAMUSCULAR; INTRAVENOUS at 18:49

## 2021-08-20 RX ADMIN — DEXAMETHASONE SODIUM PHOSPHATE 8 MG: 4 INJECTION, SOLUTION INTRAMUSCULAR; INTRAVENOUS at 18:27

## 2021-08-20 RX ADMIN — LIDOCAINE HYDROCHLORIDE 100 MG: 20 INJECTION, SOLUTION INFILTRATION; PERINEURAL at 18:17

## 2021-08-20 RX ADMIN — MIDAZOLAM 1 MG: 1 INJECTION INTRAMUSCULAR; INTRAVENOUS at 16:54

## 2021-08-20 RX ADMIN — KETOROLAC TROMETHAMINE 30 MG: 30 INJECTION, SOLUTION INTRAMUSCULAR at 18:49

## 2021-08-20 RX ADMIN — LABETALOL HYDROCHLORIDE 5 MG: 5 INJECTION, SOLUTION INTRAVENOUS at 19:34

## 2021-08-20 RX ADMIN — PROPOFOL 50 MG: 10 INJECTION, EMULSION INTRAVENOUS at 18:18

## 2021-08-20 RX ADMIN — FENTANYL CITRATE 50 MCG: 50 INJECTION, SOLUTION INTRAMUSCULAR; INTRAVENOUS at 20:00

## 2021-08-20 RX ADMIN — CEFAZOLIN SODIUM 2 G: 2 INJECTION, SOLUTION INTRAVENOUS at 18:12

## 2021-08-20 RX ADMIN — SODIUM CHLORIDE, POTASSIUM CHLORIDE, SODIUM LACTATE AND CALCIUM CHLORIDE 9 ML/HR: 600; 310; 30; 20 INJECTION, SOLUTION INTRAVENOUS at 16:38

## 2021-08-20 RX ADMIN — FAMOTIDINE 20 MG: 10 INJECTION INTRAVENOUS at 16:38

## 2021-08-20 RX ADMIN — OXYCODONE AND ACETAMINOPHEN 1 TABLET: 325; 10 TABLET ORAL at 19:36

## 2021-08-20 RX ADMIN — PROPOFOL 200 MG: 10 INJECTION, EMULSION INTRAVENOUS at 18:17

## 2021-08-20 NOTE — ANESTHESIA PROCEDURE NOTES
Airway  Urgency: elective    Date/Time: 8/20/2021 6:21 PM    General Information and Staff    Patient location during procedure: OR  Anesthesiologist: Elsi Castillo MD  CRNA: Jaqueline Russell CRNA    Indications and Patient Condition    Preoxygenated: yes  Mask difficulty assessment: 0 - not attempted    Final Airway Details  Final airway type: supraglottic airway      Successful airway: classic  Size 4    Number of attempts at approach: 2    Additional Comments  Pt to OR and positioned self to OR table. Monitors applied. PreO2: SIVI and easy insertion LMA. ETCO2 +, Equal and bilateral chest rise

## 2021-08-20 NOTE — OP NOTE
URETEROSCOPY LASER LITHOTRIPSY WITH STENT INSERTION  Procedure Note    Bob Henry JrKerline  8/20/2021    Pre-op Diagnosis:   Right ureteral calculus    Post-op Diagnosis:     Post-Op Diagnosis Codes:     * Ureteral calculus [N20.1]    Procedure(s):  RIGHT URETEROSCOPY LASER LITHOTRIPSY WITH STENT INSERTION    Surgeon(s):  Andres Leos MD    Anesthesia: General    Staff:   Circulator: Hiwot Perry RN  Laser Staff: Nichole Raya  Radiology Technologist: Bonnie Moore  Scrub Person: MychalAnkur Betzaida    Estimated Blood Loss: 10 mL    Specimens:                Order Name Source Comment Collection Info Order Time   COVID PRE-OP / PRE-PROCEDURE SCREENING ORDER (NO ISOLATION) Nasopharynx   8/20/2021  2:49 PM     Please select your location:   [facility]          COVID Screening Order:   L&D/EMERGENT- CEPHEID/JOSE RAMON, 8-12 HR TAT [[id number]]          Previously tested for COVID-19?   Yes          Employed in healthcare setting?   Unknown          Symptomatic for COVID-19 as defined by CDC?   Unknown          Hospitalized for COVID-19?   No          Admitted to ICU for COVID-19?   Unknown          Resident in a congregate (group) care setting?   No          Release to patient   Immediate        STONE ANALYSIS Ureter, Right  Collected By: Andres Leos MD 8/20/2021  6:51 PM     Release to patient   Immediate              Drains:   Ureteral Drain/Stent Right ureter 6 Fr. (Active)       Ureteral Drain/Stent Right ureter (Active)       Findings: Impacted right distal stone measuring 4 mm.  Broken up with laser and all fragments removed.  Stent placed with tether.    Complications: None apparent    Indications: 41-year-old gentleman with right renal colic secondary to right distal stone now presents for ureteroscopy.    Procedure: Patient was taken to the operative suite given general anesthesia.  Placed in lithotomy.  Prepped and draped in a sterile fashion.  Surgical timeout was performed.   Cystoscopy was performed.  Bilateral retrograde pyelograms were performed.  The left retrograde showed a normal ureter normal pelvis normal calyces.  The right retrograde showed a filling defect in the right distal ureter consistent with a stone with mild fullness.  Guidewire was passed up.  Rigid ureteroscopy was performed.  The stone was encountered in the distal third of the ureter probably 2 to 3 cm above the ureterovesical junction.  A 365 µm fiber was passed the stone was broken up into manageable pieces and then pulled out with a nitinol basket.  A 6 Sami by 26 cm stent was placed the right collecting system with a tether which was secured to his penis.  He was taken to recovery in stable condition.      Andres Leos MD     Date: 8/20/2021  Time: 19:06 VELASQUEZ

## 2021-08-21 NOTE — ANESTHESIA POSTPROCEDURE EVALUATION
"Patient: Bob Henry Jr.    Procedure Summary     Date: 08/20/21 Room / Location: [facility] OR 01 / [facility] MAIN OR    Anesthesia Start: 1810 Anesthesia Stop: 1903    Procedure: RIGHT URETEROSCOPY LASER LITHOTRIPSY WITH STENT INSERTION (Right ) Diagnosis:       Ureteral calculus      (Right ureteral calculus)    Surgeons: Andres Leos MD Provider: Elsi Castillo MD    Anesthesia Type: general ASA Status: 2          Anesthesia Type: general    Vitals  Vitals Value Taken Time   /106 08/20/21 2001   Temp 36.9 °C (98.5 °F) 08/20/21 1859   Pulse 77 08/20/21 2003   Resp 16 08/20/21 2000   SpO2 93 % 08/20/21 2003   Vitals shown include unvalidated device data.        Post Anesthesia Care and Evaluation    Patient location during evaluation: PACU  Patient participation: complete - patient participated  Level of consciousness: awake  Pain management: adequate  Airway patency: patent  Anesthetic complications: No anesthetic complications    Cardiovascular status: acceptable  Respiratory status: acceptable  Hydration status: acceptable    Comments: BP (!) 164/104 (BP Location: Left arm, Patient Position: Lying)   Pulse 70   Temp 36.9 °C (98.5 °F) (Oral)   Resp 16   Ht 182.9 cm (72\")   Wt 68.2 kg (150 lb 5.7 oz)   SpO2 96%   BMI 20.39 kg/m²       "

## 2021-08-27 LAB
CALCIUM OXALATE DIHYDRATE MFR STONE IR: 100 %
COLOR STONE: NORMAL
COMPN STONE: NORMAL
LABORATORY COMMENT REPORT: NORMAL
Lab: NORMAL
Lab: NORMAL
PHOTO: NORMAL
SIZE STONE: NORMAL MM
SPEC SOURCE SUBJ: NORMAL
WT STONE: 6 MG

## 2022-09-23 ENCOUNTER — LAB REQUISITION (OUTPATIENT)
Dept: LAB | Facility: HOSPITAL | Age: 42
End: 2022-09-23

## 2022-09-23 DIAGNOSIS — M43.16 SPONDYLOLISTHESIS, LUMBAR REGION: ICD-10-CM

## 2022-09-23 LAB
ANION GAP SERPL CALCULATED.3IONS-SCNC: 7 MMOL/L (ref 5–15)
BASOPHILS # BLD AUTO: 0 10*3/MM3 (ref 0–0.2)
BASOPHILS NFR BLD AUTO: 0.1 % (ref 0–1.5)
BUN SERPL-MCNC: 11 MG/DL (ref 6–20)
BUN/CREAT SERPL: 14.5 (ref 7–25)
CALCIUM SPEC-SCNC: 8.3 MG/DL (ref 8.6–10.5)
CHLORIDE SERPL-SCNC: 101 MMOL/L (ref 98–107)
CO2 SERPL-SCNC: 28 MMOL/L (ref 22–29)
CREAT SERPL-MCNC: 0.76 MG/DL (ref 0.76–1.27)
DEPRECATED RDW RBC AUTO: 41.1 FL (ref 37–54)
EGFRCR SERPLBLD CKD-EPI 2021: 115.1 ML/MIN/1.73
EOSINOPHIL # BLD AUTO: 0 10*3/MM3 (ref 0–0.4)
EOSINOPHIL NFR BLD AUTO: 0 % (ref 0.3–6.2)
ERYTHROCYTE [DISTWIDTH] IN BLOOD BY AUTOMATED COUNT: 13 % (ref 12.3–15.4)
GLUCOSE SERPL-MCNC: 154 MG/DL (ref 65–99)
HCT VFR BLD AUTO: 33 % (ref 37.5–51)
HGB BLD-MCNC: 10.9 G/DL (ref 13–17.7)
LYMPHOCYTES # BLD AUTO: 0.9 10*3/MM3 (ref 0.7–3.1)
LYMPHOCYTES NFR BLD AUTO: 4.1 % (ref 19.6–45.3)
MCH RBC QN AUTO: 30.3 PG (ref 26.6–33)
MCHC RBC AUTO-ENTMCNC: 33.1 G/DL (ref 31.5–35.7)
MCV RBC AUTO: 91.5 FL (ref 79–97)
MONOCYTES # BLD AUTO: 0.8 10*3/MM3 (ref 0.1–0.9)
MONOCYTES NFR BLD AUTO: 4 % (ref 5–12)
NEUTROPHILS NFR BLD AUTO: 19.1 10*3/MM3 (ref 1.7–7)
NEUTROPHILS NFR BLD AUTO: 91.8 % (ref 42.7–76)
NRBC BLD AUTO-RTO: 0 /100 WBC (ref 0–0.2)
PLATELET # BLD AUTO: 291 10*3/MM3 (ref 140–450)
PMV BLD AUTO: 8.3 FL (ref 6–12)
POTASSIUM SERPL-SCNC: 3.9 MMOL/L (ref 3.5–5.2)
RBC # BLD AUTO: 3.61 10*6/MM3 (ref 4.14–5.8)
SODIUM SERPL-SCNC: 136 MMOL/L (ref 136–145)
WBC NRBC COR # BLD: 20.8 10*3/MM3 (ref 3.4–10.8)

## 2022-09-23 PROCEDURE — 80048 BASIC METABOLIC PNL TOTAL CA: CPT | Performed by: NEUROLOGICAL SURGERY

## 2022-09-23 PROCEDURE — 85025 COMPLETE CBC W/AUTO DIFF WBC: CPT | Performed by: NEUROLOGICAL SURGERY

## 2025-05-20 NOTE — H&P
[facility] Surgical History and Physical    Patient Care Team:  Milton Vigil MD as PCP - General (Family Medicine)    Chief complaint right flank pain    Subjective     Patient is a 41 y.o. male presents with 5mm right distal stone and intractable pain. Failed trial of passage.     Review of Systems   Pertinent items are noted in HPI    Past Medical History:   Diagnosis Date   • Anxiety    • Arthritis     back   • GERD (gastroesophageal reflux disease)    • History of stomach ulcers    • Hypertension    • Irregular heart beat    • Kidney stone    • Restless leg syndrome    • S/P epidural steroid injection    • Septic bursitis of elbow 2018   • Spinal pain     fracture of Lumbar 5,6 - receives epidurals     Past Surgical History:   Procedure Laterality Date   • EXTRACORPOREAL SHOCK WAVE LITHOTRIPSY (ESWL) Left 11/22/2019    Procedure: LEFT CYSTOSCOPY WITH STONE BASKET EXTRACTION;  Surgeon: Antonio Arauz MD;  Location: [facility];  Service: Urology   • KIDNEY STONE SURGERY     • SHOULDER SURGERY     • WISDOM TOOTH EXTRACTION       Family History   Problem Relation Age of Onset   • Irregular heart beat Father    • No Known Problems Mother    • Malig Hyperthermia Neg Hx      Social History     Tobacco Use   • Smoking status: Never Smoker   • Smokeless tobacco: Never Used   Vaping Use   • Vaping Use: Never used   Substance Use Topics   • Alcohol use: No   • Drug use: No       Meds:  Medications Prior to Admission   Medication Sig Dispense Refill Last Dose   • buPROPion SR (WELLBUTRIN SR) 150 MG 12 hr tablet Take 150 mg by mouth Daily.   8/20/2021 at 0700   • cephalexin (KEFLEX) 500 MG capsule Take 4 times a day for 10 days. 40 capsule 0 8/20/2021 at 0700   • dilTIAZem CD (CARDIZEM CD) 240 MG 24 hr capsule Take 240 mg by mouth Daily.   8/20/2021 at 0700   • flecainide (TAMBOCOR) 100 MG tablet Take 100 mg by mouth.   8/20/2021 at 0700   • gabapentin (NEURONTIN) 300 MG capsule Take 300 mg by mouth.    "8/20/2021 at 0700   • ibuprofen (ADVIL,MOTRIN) 200 MG tablet Take 400 mg by mouth Every 6 (Six) Hours As Needed for Mild Pain .   8/6/2021   • oxyCODONE-acetaminophen (PERCOCET)  MG per tablet Take 1 tablet by mouth Every 6 (Six) Hours As Needed for Moderate Pain .   8/20/2021 at 1100       Allergies:  Patient has no known allergies.    Debilities:  none    Objective     Vital Signs  Temp:  [97.9 °F (36.6 °C)] 97.9 °F (36.6 °C)  Heart Rate:  [62-73] 65  Resp:  [18] 18  BP: (157)/(97) 157/97  No intake or output data in the 24 hours ending 08/20/21 1718  Flowsheet Rows      First Filed Value   Admission Height  182.9 cm (72\") Documented at 08/20/2021 1536   Admission Weight  68.2 kg (150 lb 5.7 oz) Documented at 08/20/2021 1536           Physical Exam:     General Appearance:    Alert, cooperative, NAD   HEENT:    No trauma, pupils reactive, hearing intact   Back:     No CVA tenderness   Lungs:     Respirations unlabored, no wheezing    Heart:    RRR, intact peripheral pulses   Abdomen:     Soft, NDNT, no masses, no guarding   :    Phallus nl   Extremities:   No edema, no deformity   Lymphatic:   No neck or groin LAD   Skin:   No bleeding, bruising or rashes   Neuro/Psych:   Orientation intact, mood/affect pleasant, no focal findings     Results Review:    I reviewed the patient's new clinical results.  Results for orders placed or performed during the hospital encounter of 08/20/21   COVID-19, [facility] IN-HOUSE CEPHEID/JOSE RAMON NP SWAB IN TRANSPORT MEDIA 8-12 HR TAT - Swab, Nasopharynx    Specimen: Nasopharynx; Swab   Result Value Ref Range    COVID19 Not Detected Not Detected - Ref. Range        Assessment:  Right Distal Ureteral Stone    Plan:    RIght ureteroscopy laserlitho stent    I discussed the patient's findings and my recommendations with patient.   Risks, complications, outcomes and alternatives discussed with the patient at the bedside and office.    Andres Leos MD  08/20/21  17:18 " EDT           PROCEDURES:  Arthroscopy, knee, left, with meniscectomy 20-May-2025 08:54:10 left knee arthroscopic partial medial and lateral menisectomy Juno Coronado

## (undated) DEVICE — GLV SURG BIOGEL LTX PF 7

## (undated) DEVICE — CATH URETRL FLXITP POLLACK STD 5F 70CM

## (undated) DEVICE — PK URETSCP 40

## (undated) DEVICE — GLV SURG TRIUMPH CLASSIC PF LTX 8 STRL

## (undated) DEVICE — CONTAINER,SPECIMEN,OR STERILE,4OZ: Brand: MEDLINE

## (undated) DEVICE — TIDISHIELD UROLOGY DRAIN BAGS FROSTY VINYL STERILE FITS SIEMENS UROSKOP ACCESS 20 PER CASE: Brand: TIDISHIELD

## (undated) DEVICE — NITINOL WIRE WITH HYDROPHILIC TIP: Brand: SENSOR

## (undated) DEVICE — BASKT RETRV STN NITNL 1.9F

## (undated) DEVICE — PRT BIOP SEALS

## (undated) DEVICE — STONE RETRIEVAL BASKET: Brand: SEGURA HEMISPHERE

## (undated) DEVICE — PREP SOL POVIDONE/IODINE BT 4OZ

## (undated) DEVICE — FIBR LASR HOLMIUM ACCUMAX 365 1PT USE